# Patient Record
Sex: MALE | Race: WHITE | Employment: FULL TIME | URBAN - METROPOLITAN AREA
[De-identification: names, ages, dates, MRNs, and addresses within clinical notes are randomized per-mention and may not be internally consistent; named-entity substitution may affect disease eponyms.]

---

## 2021-04-03 ENCOUNTER — APPOINTMENT (OUTPATIENT)
Dept: GENERAL RADIOLOGY | Age: 71
End: 2021-04-03
Payer: COMMERCIAL

## 2021-04-03 ENCOUNTER — APPOINTMENT (OUTPATIENT)
Dept: CT IMAGING | Age: 71
End: 2021-04-03
Payer: COMMERCIAL

## 2021-04-03 ENCOUNTER — HOSPITAL ENCOUNTER (EMERGENCY)
Age: 71
Discharge: HOME | End: 2021-04-03
Attending: EMERGENCY MEDICINE
Payer: COMMERCIAL

## 2021-04-03 VITALS
WEIGHT: 215 LBS | DIASTOLIC BLOOD PRESSURE: 86 MMHG | HEIGHT: 69 IN | SYSTOLIC BLOOD PRESSURE: 176 MMHG | RESPIRATION RATE: 16 BRPM | HEART RATE: 78 BPM | TEMPERATURE: 97.4 F | BODY MASS INDEX: 31.84 KG/M2 | OXYGEN SATURATION: 97 %

## 2021-04-03 DIAGNOSIS — M25.511 ACUTE PAIN OF RIGHT SHOULDER: ICD-10-CM

## 2021-04-03 DIAGNOSIS — S09.90XA INJURY OF HEAD, INITIAL ENCOUNTER: ICD-10-CM
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
## 2021-04-03 LAB
ALBUMIN SERPL-MCNC: 4.1 G/DL (ref 3.5–5.2)
ALP BLD-CCNC: 92 U/L (ref 40–129)
ALT SERPL-CCNC: 22 U/L (ref 0–40)
ANION GAP SERPL CALCULATED.3IONS-SCNC: 8 MMOL/L (ref 7–16)
AST SERPL-CCNC: 26 U/L (ref 0–39)
BASOPHILS ABSOLUTE: 0.04 E9/L (ref 0–0.2)
BASOPHILS RELATIVE PERCENT: 0.5 % (ref 0–2)
BILIRUB SERPL-MCNC: 0.5 MG/DL (ref 0–1.2)
BUN BLDV-MCNC: 21 MG/DL (ref 8–23)
CALCIUM SERPL-MCNC: 9.4 MG/DL (ref 8.6–10.2)
CHLORIDE BLD-SCNC: 105 MMOL/L (ref 98–107)
CO2: 26 MMOL/L (ref 22–29)
CREAT SERPL-MCNC: 1.4 MG/DL (ref 0.7–1.2)
EOSINOPHILS ABSOLUTE: 0.31 E9/L (ref 0.05–0.5)
EOSINOPHILS RELATIVE PERCENT: 3.7 % (ref 0–6)
GFR AFRICAN AMERICAN: >60
GFR NON-AFRICAN AMERICAN: 50 ML/MIN/1.73
GLUCOSE BLD-MCNC: 97 MG/DL (ref 74–99)
HCT VFR BLD CALC: 46 % (ref 37–54)
HEMOGLOBIN: 15.5 G/DL (ref 12.5–16.5)
IMMATURE GRANULOCYTES #: 0.02 E9/L
IMMATURE GRANULOCYTES %: 0.2 % (ref 0–5)
INR BLD: 1.1
LIPASE: 41 U/L (ref 13–60)
LYMPHOCYTES ABSOLUTE: 2.8 E9/L (ref 1.5–4)
LYMPHOCYTES RELATIVE PERCENT: 33.5 % (ref 20–42)
MCH RBC QN AUTO: 33.8 PG (ref 26–35)
MCHC RBC AUTO-ENTMCNC: 33.7 % (ref 32–34.5)
MCV RBC AUTO: 100.4 FL (ref 80–99.9)
MONOCYTES ABSOLUTE: 1.06 E9/L (ref 0.1–0.95)
MONOCYTES RELATIVE PERCENT: 12.7 % (ref 2–12)
NEUTROPHILS ABSOLUTE: 4.13 E9/L (ref 1.8–7.3)
NEUTROPHILS RELATIVE PERCENT: 49.4 % (ref 43–80)
PDW BLD-RTO: 12.1 FL (ref 11.5–15)
PLATELET # BLD: 224 E9/L (ref 130–450)
PMV BLD AUTO: 10.3 FL (ref 7–12)
POTASSIUM REFLEX MAGNESIUM: 4.3 MMOL/L (ref 3.5–5)
PROTHROMBIN TIME: 11.3 SEC (ref 9.3–12.4)
RBC # BLD: 4.58 E12/L (ref 3.8–5.8)
SODIUM BLD-SCNC: 139 MMOL/L (ref 132–146)
TOTAL PROTEIN: 7.3 G/DL (ref 6.4–8.3)
TROPONIN: <0.01 NG/ML (ref 0–0.03)
WBC # BLD: 8.4 E9/L (ref 4.5–11.5)

## 2021-04-03 PROCEDURE — 6360000002 HC RX W HCPCS: Performed by: EMERGENCY MEDICINE

## 2021-04-03 PROCEDURE — 70450 CT HEAD/BRAIN W/O DYE: CPT

## 2021-04-03 PROCEDURE — 72125 CT NECK SPINE W/O DYE: CPT

## 2021-04-03 PROCEDURE — 84484 ASSAY OF TROPONIN QUANT: CPT

## 2021-04-03 PROCEDURE — 73130 X-RAY EXAM OF HAND: CPT

## 2021-04-03 PROCEDURE — 73060 X-RAY EXAM OF HUMERUS: CPT

## 2021-04-03 PROCEDURE — 80053 COMPREHEN METABOLIC PANEL: CPT

## 2021-04-03 PROCEDURE — 93005 ELECTROCARDIOGRAM TRACING: CPT | Performed by: EMERGENCY MEDICINE

## 2021-04-03 PROCEDURE — 74177 CT ABD & PELVIS W/CONTRAST: CPT

## 2021-04-03 PROCEDURE — 6360000004 HC RX CONTRAST MEDICATION: Performed by: RADIOLOGY

## 2021-04-03 PROCEDURE — 71260 CT THORAX DX C+: CPT

## 2021-04-03 PROCEDURE — 85025 COMPLETE CBC W/AUTO DIFF WBC: CPT

## 2021-04-03 PROCEDURE — 73552 X-RAY EXAM OF FEMUR 2/>: CPT

## 2021-04-03 PROCEDURE — 73502 X-RAY EXAM HIP UNI 2-3 VIEWS: CPT

## 2021-04-03 PROCEDURE — 83690 ASSAY OF LIPASE: CPT

## 2021-04-03 PROCEDURE — 99284 EMERGENCY DEPT VISIT MOD MDM: CPT

## 2021-04-03 PROCEDURE — 96374 THER/PROPH/DIAG INJ IV PUSH: CPT

## 2021-04-03 PROCEDURE — 85610 PROTHROMBIN TIME: CPT

## 2021-04-03 PROCEDURE — 73030 X-RAY EXAM OF SHOULDER: CPT

## 2021-04-03 RX ORDER — HYDRALAZINE HYDROCHLORIDE 20 MG/ML
10 INJECTION INTRAMUSCULAR; INTRAVENOUS ONCE
Status: COMPLETED | OUTPATIENT
Start: 2021-04-03 | End: 2021-04-03

## 2021-04-03 RX ORDER — PROMETHAZINE HYDROCHLORIDE 25 MG/ML
25 INJECTION, SOLUTION INTRAMUSCULAR; INTRAVENOUS ONCE
Status: DISCONTINUED | OUTPATIENT
Start: 2021-04-03 | End: 2021-04-03 | Stop reason: HOSPADM

## 2021-04-03 RX ORDER — FENTANYL CITRATE 50 UG/ML
50 INJECTION, SOLUTION INTRAMUSCULAR; INTRAVENOUS ONCE
Status: DISCONTINUED | OUTPATIENT
Start: 2021-04-03 | End: 2021-04-03

## 2021-04-03 RX ADMIN — HYDRALAZINE HYDROCHLORIDE 10 MG: 20 INJECTION INTRAMUSCULAR; INTRAVENOUS at 14:52

## 2021-04-03 RX ADMIN — IOPAMIDOL 75 ML: 755 INJECTION, SOLUTION INTRAVENOUS at 15:40

## 2021-04-03 SDOH — HEALTH STABILITY: MENTAL HEALTH: HOW OFTEN DO YOU HAVE A DRINK CONTAINING ALCOHOL?: NEVER

## 2021-04-03 ASSESSMENT — ENCOUNTER SYMPTOMS
COUGH: 0
SHORTNESS OF BREATH: 0
ABDOMINAL PAIN: 0
RHINORRHEA: 0
PHOTOPHOBIA: 0
NAUSEA: 0
BACK PAIN: 1
VOMITING: 0
COLOR CHANGE: 0

## 2021-04-03 ASSESSMENT — PAIN DESCRIPTION - ORIENTATION: ORIENTATION: RIGHT

## 2021-04-03 NOTE — ED NOTES
RN saw pt standing outside of room. RN asked pt what was wrong and pt stated he was feeling nauseated and not good. RN helped pt back to bed and told pt that RN was going to ask for medication at this time. RN came back to room with antiemetic and pt stated that he was feeling better and did not want the medication.  Pt stated he had to use the restroom at this time     Phillip Contreras RN  04/03/21 0571

## 2021-04-03 NOTE — ED PROVIDER NOTES
Suleman See is a 69 y.o. male presenting to the ED for falls, beginning yesterday ago. The complaint has been persistent, moderate in severity, and worsened by nothing. 68 yo m who notes fell yesterday at [facility] in [place] over a curb and fell onto right side. Pt denies loc. Pt notes pain is moderaqte and on all rigfht side. Pain is worse in right shoulder right lower pelvis and hip;. Pt also notes posterior rib pain blow scapula. [ pt took naproxen for pain at home. Pt denies any sob, cough or cold sxm abd ominal pain, chest pain, syncope. Pt drove himself in and ambulated back    Review of Systems:   Review of Systems   Constitutional: Negative for diaphoresis, fatigue and fever. HENT: Negative for congestion, postnasal drip and rhinorrhea. Eyes: Negative for photophobia and visual disturbance. Respiratory: Negative for cough and shortness of breath. Cardiovascular: Positive for chest pain. Negative for palpitations. Gastrointestinal: Negative for abdominal pain, nausea and vomiting. Endocrine: Negative for polyphagia and polyuria. Genitourinary: Negative for dysuria, flank pain and frequency. Musculoskeletal: Positive for back pain and neck pain. Skin: Negative for color change and wound. Allergic/Immunologic: Negative for food allergies and immunocompromised state. Neurological: Negative for seizures, syncope, numbness and headaches. Hematological: Does not bruise/bleed easily. Psychiatric/Behavioral: Negative for agitation. The patient is not nervous/anxious.                  --------------------------------------------- PAST HISTORY ---------------------------------------------  Past Medical History:  has no past medical history on file. Past Surgical History:  has a past surgical history that includes Tonsillectomy; Nose surgery; and Appendectomy. Social History:  reports that he has quit smoking.  He has never used smokeless tobacco. He reports that he does not drink alcohol or use drugs. Family History: family history is not on file. The patients home medications have been reviewed.     Allergies: Codeine    -------------------------------------------------- RESULTS -------------------------------------------------  All laboratory and radiology results have been personally reviewed by myself   LABS:  Results for orders placed or performed during the hospital encounter of 04/03/21   CBC Auto Differential   Result Value Ref Range    WBC 8.4 4.5 - 11.5 E9/L    RBC 4.58 3.80 - 5.80 E12/L    Hemoglobin 15.5 12.5 - 16.5 g/dL    Hematocrit 46.0 37.0 - 54.0 %    .4 (H) 80.0 - 99.9 fL    MCH 33.8 26.0 - 35.0 pg    MCHC 33.7 32.0 - 34.5 %    RDW 12.1 11.5 - 15.0 fL    Platelets 265 154 - 326 E9/L    MPV 10.3 7.0 - 12.0 fL    Neutrophils % 49.4 43.0 - 80.0 %    Immature Granulocytes % 0.2 0.0 - 5.0 %    Lymphocytes % 33.5 20.0 - 42.0 %    Monocytes % 12.7 (H) 2.0 - 12.0 %    Eosinophils % 3.7 0.0 - 6.0 %    Basophils % 0.5 0.0 - 2.0 %    Neutrophils Absolute 4.13 1.80 - 7.30 E9/L    Immature Granulocytes # 0.02 E9/L    Lymphocytes Absolute 2.80 1.50 - 4.00 E9/L    Monocytes Absolute 1.06 (H) 0.10 - 0.95 E9/L    Eosinophils Absolute 0.31 0.05 - 0.50 E9/L    Basophils Absolute 0.04 0.00 - 0.20 E9/L   Comprehensive Metabolic Panel w/ Reflex to MG   Result Value Ref Range    Sodium 139 132 - 146 mmol/L    Potassium reflex Magnesium 4.3 3.5 - 5.0 mmol/L    Chloride 105 98 - 107 mmol/L    CO2 26 22 - 29 mmol/L    Anion Gap 8 7 - 16 mmol/L    Glucose 97 74 - 99 mg/dL    BUN 21 8 - 23 mg/dL    CREATININE 1.4 (H) 0.7 - 1.2 mg/dL    GFR Non-African American 50 >=60 mL/min/1.73    GFR African American >60     Calcium 9.4 8.6 - 10.2 mg/dL    Total Protein 7.3 6.4 - 8.3 g/dL    Albumin 4.1 3.5 - 5.2 g/dL    Total Bilirubin 0.5 0.0 - 1.2 mg/dL    Alkaline Phosphatase 92 40 - 129 U/L    ALT 22 0 - 40 U/L    AST 26 0 - 39 U/L   Lipase   Result Value Ref Range    Lipase 41 13 - 60 U/L   Troponin   Result Value Ref Range    Troponin <0.01 0.00 - 0.03 ng/mL   Protime-INR   Result Value Ref Range    Protime 11.3 9.3 - 12.4 sec    INR 1.1    EKG 12 Lead   Result Value Ref Range    Ventricular Rate 70 BPM    Atrial Rate 70 BPM    P-R Interval 206 ms    QRS Duration 82 ms    Q-T Interval 408 ms    QTc Calculation (Bazett) 440 ms    P Axis 44 degrees    R Axis -14 degrees    T Axis -15 degrees       RADIOLOGY:  Interpreted by Radiologist.  XR HAND LEFT (MIN 3 VIEWS)   Final Result   No evidence of acute fracture or dislocation of the left hand. Osteoarthritic changes noted. XR SHOULDER RIGHT (MIN 2 VIEWS)   Final Result   Mild osteoarthritic changes along the glenohumeral joint and diffuse   osteopenia with no acute abnormality seen. Mild hypert[facility] changes of the [facility] joint. XR FEMUR RIGHT (MIN 2 VIEWS)   Final Result   1. No acute osseous findings in the pelvis, right hip, nor right femur on   these exams. 2.  Severe right and mild left hip osteoarthritis. Right greater than left   CAM type deformities of the femoral head/neck junctions. 3.  Mild right knee osteoarthritis. Mild degenerative changes of the lumbar   spine and SI joints partially imaged. RECOMMENDATION:   (Negative radiographs should not deter from obtaining focused cross-sectional   imaging if there is high concern for an acute osseous injury. )         XR HIP 2-3 VW W PELVIS RIGHT   Final Result   1. No acute osseous findings in the pelvis, right hip, nor right femur on   these exams. 2.  Severe right and mild left hip osteoarthritis. Right greater than left   CAM type deformities of the femoral head/neck junctions. 3.  Mild right knee osteoarthritis. Mild degenerative changes of the lumbar   spine and SI joints partially imaged.       RECOMMENDATION:   (Negative radiographs should not deter from obtaining focused cross-sectional   imaging if there is high concern for an None   Final Result   No acute traumatic injury in the chest,, no acute infiltrate or pneumothorax. CT ABDOMEN AND PELVIS. The liver, gallbladder, spleen, pancreas, the adrenals and the kidneys are   normal except for cystic lesions in the kidneys, largest 1 in the right   kidney measuring 3.2 cm. There is calcification of the aorta and   degenerative changes in the lumbar spine and right hip. Pelvis. The bladder is contracted with mild wall thickening. The prostate   gland is prominent. There is diverticulosis of colon with constipation. There is no diverticulitis. There is probable previous fracture deformity of   the right hip. Impression      No acute traumatic injury, solid organ injury or acute inflammation in the   abdomen pelvis. Diverticulosis of colon with constipation without diverticulitis.             ------------------------- NURSING NOTES AND VITALS REVIEWED ---------------------------   The nursing notes within the ED encounter and vital signs as below have been reviewed. BP (!) 176/86   Pulse 78   Temp 97.4 °F (36.3 °C) (Temporal)   Resp 16   Ht 5' 9\" (1.753 m)   Wt 215 lb (97.5 kg)   SpO2 97%   BMI 31.75 kg/m²   Oxygen Saturation Interpretation: Normal      ---------------------------------------------------PHYSICAL EXAM--------------------------------------  \  Physical Exam  Vitals signs reviewed. Constitutional:       General: He is not in acute distress. Appearance: Normal appearance. He is not toxic-appearing. HENT:      Head: Normocephalic and atraumatic. Right Ear: External ear normal.      Left Ear: External ear normal.      Nose: Nose normal. No congestion. Mouth/Throat:      Mouth: Mucous membranes are moist.      Pharynx: Oropharynx is clear. No posterior oropharyngeal erythema. Eyes:      Extraocular Movements: Extraocular movements intact. Pupils: Pupils are equal, round, and reactive to light.    Neck: Musculoskeletal: Normal range of motion and neck supple. No muscular tenderness. Cardiovascular:      Rate and Rhythm: Normal rate and regular rhythm. Pulses: Normal pulses. Heart sounds: No murmur. Pulmonary:      Effort: Pulmonary effort is normal.      Breath sounds: No wheezing or rhonchi. Chest:      Chest wall: No tenderness. Abdominal:      General: Bowel sounds are normal.      Tenderness: There is no abdominal tenderness. There is no right CVA tenderness, left CVA tenderness or guarding. Musculoskeletal:         General: No swelling or deformity. Right shoulder: He exhibits decreased range of motion, tenderness and swelling. Skin:     General: Skin is warm and dry. Capillary Refill: Capillary refill takes less than 2 seconds. Neurological:      General: No focal deficit present. Mental Status: He is alert and oriented to person, place, and time. Psychiatric:         Mood and Affect: Mood normal.      m          ------------------------------ ED COURSE/MEDICAL DECISION MAKING----------------------  Medications   hydrALAZINE (APRESOLINE) injection 10 mg (10 mg Intravenous Given 4/3/21 1452)   iopamidol (ISOVUE-370) 76 % injection 75 mL (75 mLs Intravenous Given 4/3/21 1540)         ED COURSE:  ED Course as of Apr 04 0632   Sat Apr 03, 2021   1741 Discussed results and plan thus far. Patient was has not seen his family doctor in some time but did have carotid ultrasound about 2 years ago this Was Telling Him about the CT Findings Showing Atherosclerosis of His Carotids. Of Note While Talking the Patient Notes Patient Has Significant Ecchymosis of the Left Thumb with minimal Snuffbox Tenderness Will Order X-Ray Hand to Evaluate for Possible Scaphoid/thumb Fracture. Patient does state he would like to be discharged as he has been walking around department without difficulty.   Did discuss the possible old pelvic fracture in the site where he is currently having some condition is good      NOTE: This report was transcribed using voice recognition software.  Every effort was made to ensure accuracy; however, inadvertent computerized transcription errors may be present       Vadim Daigle DO  04/04/21 8857

## 2021-04-04 LAB
EKG ATRIAL RATE: 70 BPM
EKG P AXIS: 44 DEGREES
EKG P-R INTERVAL: 206 MS
EKG Q-T INTERVAL: 408 MS
EKG QRS DURATION: 82 MS
EKG QTC CALCULATION (BAZETT): 440 MS
EKG R AXIS: -14 DEGREES
EKG T AXIS: -15 DEGREES
EKG VENTRICULAR RATE: 70 BPM

## 2021-04-04 PROCEDURE — 93010 ELECTROCARDIOGRAM REPORT: CPT | Performed by: INTERNAL MEDICINE

## 2024-12-05 ENCOUNTER — ANESTHESIA (OUTPATIENT)
Dept: INTERVENTIONAL RADIOLOGY/VASCULAR | Age: 74
End: 2024-12-05
Payer: MEDICARE

## 2024-12-05 ENCOUNTER — HOSPITAL ENCOUNTER (INPATIENT)
Age: 74
LOS: 8 days | Discharge: NURSING, REHAB OR LONG-TERM CARE | DRG: 023 | End: 2024-12-13
Attending: STUDENT IN AN ORGANIZED HEALTH CARE EDUCATION/TRAINING PROGRAM | Admitting: INTERNAL MEDICINE
Payer: MEDICARE

## 2024-12-05 ENCOUNTER — ANESTHESIA EVENT (OUTPATIENT)
Dept: INTERVENTIONAL RADIOLOGY/VASCULAR | Age: 74
End: 2024-12-05
Payer: MEDICARE

## 2024-12-05 ENCOUNTER — APPOINTMENT (OUTPATIENT)
Dept: INTERVENTIONAL RADIOLOGY/VASCULAR | Age: 74
DRG: 023 | End: 2024-12-05
Payer: MEDICARE

## 2024-12-05 DIAGNOSIS — I63.9 CEREBROVASCULAR ACCIDENT (CVA), UNSPECIFIED MECHANISM (HCC): Primary | ICD-10-CM

## 2024-12-05 LAB
ALBUMIN SERPL-MCNC: 3.5 G/DL (ref 3.5–5.2)
ALP SERPL-CCNC: 66 U/L (ref 40–129)
ALT SERPL-CCNC: 13 U/L (ref 0–40)
ANION GAP SERPL CALCULATED.3IONS-SCNC: 7 MMOL/L (ref 7–16)
AST SERPL-CCNC: 18 U/L (ref 0–39)
BASOPHILS # BLD: 0.01 K/UL (ref 0–0.2)
BASOPHILS NFR BLD: 0 % (ref 0–2)
BILIRUB SERPL-MCNC: 0.7 MG/DL (ref 0–1.2)
BUN SERPL-MCNC: 21 MG/DL (ref 6–23)
CALCIUM SERPL-MCNC: 7.8 MG/DL (ref 8.6–10.2)
CHLORIDE SERPL-SCNC: 106 MMOL/L (ref 98–107)
CO2 SERPL-SCNC: 22 MMOL/L (ref 22–29)
CREAT SERPL-MCNC: 1.1 MG/DL (ref 0.7–1.2)
EOSINOPHIL # BLD: 0.01 K/UL (ref 0.05–0.5)
EOSINOPHILS RELATIVE PERCENT: 0 % (ref 0–6)
ERYTHROCYTE [DISTWIDTH] IN BLOOD BY AUTOMATED COUNT: 12.3 % (ref 11.5–15)
GFR, ESTIMATED: 69 ML/MIN/1.73M2
GLUCOSE SERPL-MCNC: 156 MG/DL (ref 74–99)
HCT VFR BLD AUTO: 36.9 % (ref 37–54)
HGB BLD-MCNC: 12.3 G/DL (ref 12.5–16.5)
IMM GRANULOCYTES # BLD AUTO: 0.05 K/UL (ref 0–0.58)
IMM GRANULOCYTES NFR BLD: 0 % (ref 0–5)
INR PPP: 1.1
LYMPHOCYTES NFR BLD: 0.63 K/UL (ref 1.5–4)
LYMPHOCYTES RELATIVE PERCENT: 6 % (ref 20–42)
MCH RBC QN AUTO: 33.7 PG (ref 26–35)
MCHC RBC AUTO-ENTMCNC: 33.3 G/DL (ref 32–34.5)
MCV RBC AUTO: 101.1 FL (ref 80–99.9)
MONOCYTES NFR BLD: 0.1 K/UL (ref 0.1–0.95)
MONOCYTES NFR BLD: 1 % (ref 2–12)
NEUTROPHILS NFR BLD: 93 % (ref 43–80)
NEUTS SEG NFR BLD: 10.39 K/UL (ref 1.8–7.3)
PLATELET # BLD AUTO: 161 K/UL (ref 130–450)
PMV BLD AUTO: 10.8 FL (ref 7–12)
POTASSIUM SERPL-SCNC: 4.2 MMOL/L (ref 3.5–5)
PROT SERPL-MCNC: 5.8 G/DL (ref 6.4–8.3)
PROTHROMBIN TIME: 12.1 SEC (ref 9.3–12.4)
RBC # BLD AUTO: 3.65 M/UL (ref 3.8–5.8)
SODIUM SERPL-SCNC: 135 MMOL/L (ref 132–146)
TROPONIN I SERPL HS-MCNC: 24 NG/L (ref 0–11)
WBC OTHER # BLD: 11.2 K/UL (ref 4.5–11.5)

## 2024-12-05 PROCEDURE — 3700000001 HC ADD 15 MINUTES (ANESTHESIA): Performed by: PSYCHIATRY & NEUROLOGY

## 2024-12-05 PROCEDURE — C1874 STENT, COATED/COV W/DEL SYS: HCPCS | Performed by: PSYCHIATRY & NEUROLOGY

## 2024-12-05 PROCEDURE — 2500000003 HC RX 250 WO HCPCS: Performed by: PSYCHIATRY & NEUROLOGY

## 2024-12-05 PROCEDURE — 3700000000 HC ANESTHESIA ATTENDED CARE: Performed by: PSYCHIATRY & NEUROLOGY

## 2024-12-05 PROCEDURE — 84484 ASSAY OF TROPONIN QUANT: CPT

## 2024-12-05 PROCEDURE — 037K3DZ DILATION OF RIGHT INTERNAL CAROTID ARTERY WITH INTRALUMINAL DEVICE, PERCUTANEOUS APPROACH: ICD-10-PCS | Performed by: PSYCHIATRY & NEUROLOGY

## 2024-12-05 PROCEDURE — 36620 INSERTION CATHETER ARTERY: CPT | Performed by: ANESTHESIOLOGY

## 2024-12-05 PROCEDURE — 61645 PERQ ART M-THROMBECT &/NFS: CPT | Performed by: PSYCHIATRY & NEUROLOGY

## 2024-12-05 PROCEDURE — 37799 UNLISTED PX VASCULAR SURGERY: CPT

## 2024-12-05 PROCEDURE — 6360000002 HC RX W HCPCS: Performed by: NURSE ANESTHETIST, CERTIFIED REGISTERED

## 2024-12-05 PROCEDURE — C1769 GUIDE WIRE: HCPCS

## 2024-12-05 PROCEDURE — 2580000003 HC RX 258

## 2024-12-05 PROCEDURE — 80053 COMPREHEN METABOLIC PANEL: CPT

## 2024-12-05 PROCEDURE — 2580000003 HC RX 258: Performed by: PSYCHIATRY & NEUROLOGY

## 2024-12-05 PROCEDURE — 6360000002 HC RX W HCPCS: Performed by: PSYCHIATRY & NEUROLOGY

## 2024-12-05 PROCEDURE — 99291 CRITICAL CARE FIRST HOUR: CPT | Performed by: SURGERY

## 2024-12-05 PROCEDURE — 99223 1ST HOSP IP/OBS HIGH 75: CPT | Performed by: INTERNAL MEDICINE

## 2024-12-05 PROCEDURE — C1876 STENT, NON-COA/NON-COV W/DEL: HCPCS | Performed by: PSYCHIATRY & NEUROLOGY

## 2024-12-05 PROCEDURE — 2500000003 HC RX 250 WO HCPCS: Performed by: NURSE ANESTHETIST, CERTIFIED REGISTERED

## 2024-12-05 PROCEDURE — 6370000000 HC RX 637 (ALT 250 FOR IP): Performed by: PSYCHIATRY & NEUROLOGY

## 2024-12-05 PROCEDURE — 6360000004 HC RX CONTRAST MEDICATION: Performed by: PSYCHIATRY & NEUROLOGY

## 2024-12-05 PROCEDURE — 76377 3D RENDER W/INTRP POSTPROCES: CPT

## 2024-12-05 PROCEDURE — 99223 1ST HOSP IP/OBS HIGH 75: CPT | Performed by: PSYCHIATRY & NEUROLOGY

## 2024-12-05 PROCEDURE — 85610 PROTHROMBIN TIME: CPT

## 2024-12-05 PROCEDURE — 61645 PERQ ART M-THROMBECT &/NFS: CPT

## 2024-12-05 PROCEDURE — 85025 COMPLETE CBC W/AUTO DIFF WBC: CPT

## 2024-12-05 PROCEDURE — 03CG3Z7 EXTIRPATION OF MATTER FROM INTRACRANIAL ARTERY USING STENT RETRIEVER, PERCUTANEOUS APPROACH: ICD-10-PCS | Performed by: PSYCHIATRY & NEUROLOGY

## 2024-12-05 PROCEDURE — 2000000000 HC ICU R&B

## 2024-12-05 PROCEDURE — 2580000003 HC RX 258: Performed by: NURSE ANESTHETIST, CERTIFIED REGISTERED

## 2024-12-05 PROCEDURE — 99285 EMERGENCY DEPT VISIT HI MDM: CPT

## 2024-12-05 RX ORDER — ROCURONIUM BROMIDE 10 MG/ML
INJECTION, SOLUTION INTRAVENOUS
Status: DISCONTINUED | OUTPATIENT
Start: 2024-12-05 | End: 2024-12-05 | Stop reason: SDUPTHER

## 2024-12-05 RX ORDER — FENTANYL CITRATE 50 UG/ML
INJECTION, SOLUTION INTRAMUSCULAR; INTRAVENOUS
Status: DISCONTINUED | OUTPATIENT
Start: 2024-12-05 | End: 2024-12-05 | Stop reason: SDUPTHER

## 2024-12-05 RX ORDER — PROPOFOL 10 MG/ML
INJECTION, EMULSION INTRAVENOUS
Status: DISCONTINUED | OUTPATIENT
Start: 2024-12-05 | End: 2024-12-05 | Stop reason: SDUPTHER

## 2024-12-05 RX ORDER — ASPIRIN 300 MG/1
300 SUPPOSITORY RECTAL DAILY
Status: DISCONTINUED | OUTPATIENT
Start: 2024-12-05 | End: 2024-12-08

## 2024-12-05 RX ORDER — LIDOCAINE HYDROCHLORIDE 20 MG/ML
INJECTION, SOLUTION EPIDURAL; INFILTRATION; INTRACAUDAL; PERINEURAL
Status: DISCONTINUED | OUTPATIENT
Start: 2024-12-05 | End: 2024-12-05 | Stop reason: SDUPTHER

## 2024-12-05 RX ORDER — SODIUM CHLORIDE 9 MG/ML
INJECTION, SOLUTION INTRAVENOUS
Status: DISCONTINUED | OUTPATIENT
Start: 2024-12-05 | End: 2024-12-05 | Stop reason: SDUPTHER

## 2024-12-05 RX ORDER — DEXAMETHASONE SODIUM PHOSPHATE 10 MG/ML
INJECTION, SOLUTION INTRAMUSCULAR; INTRAVENOUS
Status: DISCONTINUED | OUTPATIENT
Start: 2024-12-05 | End: 2024-12-05 | Stop reason: SDUPTHER

## 2024-12-05 RX ORDER — EPTIFIBATIDE 0.75 MG/ML
0.5 INJECTION, SOLUTION INTRAVENOUS CONTINUOUS
Status: DISPENSED | OUTPATIENT
Start: 2024-12-05 | End: 2024-12-06

## 2024-12-05 RX ORDER — SODIUM CHLORIDE 0.9 % (FLUSH) 0.9 %
5-40 SYRINGE (ML) INJECTION PRN
Status: DISCONTINUED | OUTPATIENT
Start: 2024-12-05 | End: 2024-12-14 | Stop reason: HOSPADM

## 2024-12-05 RX ORDER — IOPAMIDOL 612 MG/ML
INJECTION, SOLUTION INTRAVASCULAR PRN
Status: COMPLETED | OUTPATIENT
Start: 2024-12-05 | End: 2024-12-05

## 2024-12-05 RX ORDER — ONDANSETRON 4 MG/1
4 TABLET, ORALLY DISINTEGRATING ORAL EVERY 8 HOURS PRN
Status: DISCONTINUED | OUTPATIENT
Start: 2024-12-05 | End: 2024-12-14 | Stop reason: HOSPADM

## 2024-12-05 RX ORDER — ONDANSETRON 2 MG/ML
4 INJECTION INTRAMUSCULAR; INTRAVENOUS EVERY 6 HOURS PRN
Status: DISCONTINUED | OUTPATIENT
Start: 2024-12-05 | End: 2024-12-14 | Stop reason: HOSPADM

## 2024-12-05 RX ORDER — HEPARIN SODIUM 10000 [USP'U]/ML
INJECTION, SOLUTION INTRAVENOUS; SUBCUTANEOUS PRN
Status: COMPLETED | OUTPATIENT
Start: 2024-12-05 | End: 2024-12-05

## 2024-12-05 RX ORDER — SODIUM CHLORIDE 0.9 % (FLUSH) 0.9 %
5-40 SYRINGE (ML) INJECTION EVERY 12 HOURS SCHEDULED
Status: DISCONTINUED | OUTPATIENT
Start: 2024-12-05 | End: 2024-12-14 | Stop reason: HOSPADM

## 2024-12-05 RX ORDER — SODIUM CHLORIDE 9 MG/ML
INJECTION, SOLUTION INTRAVENOUS PRN
Status: DISCONTINUED | OUTPATIENT
Start: 2024-12-05 | End: 2024-12-14 | Stop reason: HOSPADM

## 2024-12-05 RX ORDER — SODIUM CHLORIDE 9 MG/ML
INJECTION, SOLUTION INTRAVENOUS CONTINUOUS
Status: DISCONTINUED | OUTPATIENT
Start: 2024-12-05 | End: 2024-12-08

## 2024-12-05 RX ORDER — ATORVASTATIN CALCIUM 40 MG/1
40 TABLET, FILM COATED ORAL NIGHTLY
Status: DISCONTINUED | OUTPATIENT
Start: 2024-12-05 | End: 2024-12-08

## 2024-12-05 RX ORDER — ONDANSETRON 2 MG/ML
INJECTION INTRAMUSCULAR; INTRAVENOUS
Status: DISCONTINUED | OUTPATIENT
Start: 2024-12-05 | End: 2024-12-05 | Stop reason: SDUPTHER

## 2024-12-05 RX ORDER — PHENYLEPHRINE HYDROCHLORIDE 10 MG/ML
INJECTION INTRAVENOUS
Status: DISCONTINUED | OUTPATIENT
Start: 2024-12-05 | End: 2024-12-05 | Stop reason: SDUPTHER

## 2024-12-05 RX ORDER — LABETALOL HYDROCHLORIDE 5 MG/ML
5 INJECTION, SOLUTION INTRAVENOUS EVERY 10 MIN PRN
Status: DISCONTINUED | OUTPATIENT
Start: 2024-12-05 | End: 2024-12-12

## 2024-12-05 RX ORDER — EPHEDRINE SULFATE/0.9% NACL/PF 25 MG/5 ML
SYRINGE (ML) INTRAVENOUS
Status: DISCONTINUED | OUTPATIENT
Start: 2024-12-05 | End: 2024-12-05 | Stop reason: SDUPTHER

## 2024-12-05 RX ADMIN — SODIUM CHLORIDE: 9 INJECTION, SOLUTION INTRAVENOUS at 20:39

## 2024-12-05 RX ADMIN — PHENYLEPHRINE HYDROCHLORIDE 50 MCG: 10 INJECTION, SOLUTION INTRAVENOUS at 18:16

## 2024-12-05 RX ADMIN — DEXAMETHASONE SODIUM PHOSPHATE 10 MG: 10 INJECTION INTRAMUSCULAR; INTRAVENOUS at 17:14

## 2024-12-05 RX ADMIN — SODIUM CHLORIDE: 9 INJECTION, SOLUTION INTRAVENOUS at 17:40

## 2024-12-05 RX ADMIN — ASPIRIN 300 MG: 300 SUPPOSITORY RECTAL at 20:59

## 2024-12-05 RX ADMIN — PHENYLEPHRINE HYDROCHLORIDE 50 MCG: 10 INJECTION, SOLUTION INTRAVENOUS at 18:21

## 2024-12-05 RX ADMIN — PHENYLEPHRINE HYDROCHLORIDE 100 MCG: 10 INJECTION, SOLUTION INTRAVENOUS at 18:37

## 2024-12-05 RX ADMIN — ROCURONIUM BROMIDE 50 MG: 10 INJECTION, SOLUTION INTRAVENOUS at 17:14

## 2024-12-05 RX ADMIN — TICAGRELOR 90 MG: 90 TABLET ORAL at 20:59

## 2024-12-05 RX ADMIN — IOPAMIDOL 150 ML: 612 INJECTION, SOLUTION INTRAVENOUS at 19:09

## 2024-12-05 RX ADMIN — PHENYLEPHRINE HYDROCHLORIDE 100 MCG: 10 INJECTION, SOLUTION INTRAVENOUS at 18:30

## 2024-12-05 RX ADMIN — SODIUM CHLORIDE: 9 INJECTION, SOLUTION INTRAVENOUS at 19:00

## 2024-12-05 RX ADMIN — Medication 1000 ML: at 19:09

## 2024-12-05 RX ADMIN — Medication 5000 UNITS: at 19:09

## 2024-12-05 RX ADMIN — SODIUM CHLORIDE: 9 INJECTION, SOLUTION INTRAVENOUS at 17:11

## 2024-12-05 RX ADMIN — SODIUM CHLORIDE, PRESERVATIVE FREE 10 ML: 5 INJECTION INTRAVENOUS at 20:40

## 2024-12-05 RX ADMIN — PROPOFOL 50 MG: 10 INJECTION, EMULSION INTRAVENOUS at 18:29

## 2024-12-05 RX ADMIN — PHENYLEPHRINE HYDROCHLORIDE 50 MCG: 10 INJECTION, SOLUTION INTRAVENOUS at 18:47

## 2024-12-05 RX ADMIN — ONDANSETRON HYDROCHLORIDE 4 MG: 2 SOLUTION INTRAMUSCULAR; INTRAVENOUS at 17:14

## 2024-12-05 RX ADMIN — EPHEDRINE SULFATE 10 MG: 5 INJECTION INTRAVENOUS at 18:55

## 2024-12-05 RX ADMIN — PHENYLEPHRINE HYDROCHLORIDE 50 MCG: 10 INJECTION, SOLUTION INTRAVENOUS at 18:11

## 2024-12-05 RX ADMIN — PHENYLEPHRINE HYDROCHLORIDE 50 MCG: 10 INJECTION, SOLUTION INTRAVENOUS at 18:41

## 2024-12-05 RX ADMIN — PHENYLEPHRINE HYDROCHLORIDE 50 MCG: 10 INJECTION, SOLUTION INTRAVENOUS at 18:33

## 2024-12-05 RX ADMIN — EPTIFIBATIDE 0.5 MCG/KG/MIN: 0.75 INJECTION INTRAVENOUS at 19:30

## 2024-12-05 RX ADMIN — PROPOFOL 170 MG: 10 INJECTION, EMULSION INTRAVENOUS at 17:14

## 2024-12-05 RX ADMIN — ATORVASTATIN CALCIUM 40 MG: 40 TABLET, FILM COATED ORAL at 20:59

## 2024-12-05 RX ADMIN — EPHEDRINE SULFATE 25 MG: 5 INJECTION INTRAVENOUS at 17:33

## 2024-12-05 RX ADMIN — PHENYLEPHRINE HYDROCHLORIDE 100 MCG: 10 INJECTION, SOLUTION INTRAVENOUS at 18:51

## 2024-12-05 RX ADMIN — PROPOFOL 30 MG: 10 INJECTION, EMULSION INTRAVENOUS at 18:05

## 2024-12-05 RX ADMIN — PHENYLEPHRINE HYDROCHLORIDE 50 MCG: 10 INJECTION, SOLUTION INTRAVENOUS at 18:26

## 2024-12-05 RX ADMIN — FENTANYL CITRATE 100 MCG: 50 INJECTION, SOLUTION INTRAMUSCULAR; INTRAVENOUS at 17:14

## 2024-12-05 RX ADMIN — LIDOCAINE HYDROCHLORIDE 40 MG: 20 INJECTION, SOLUTION EPIDURAL; INFILTRATION; INTRACAUDAL; PERINEURAL at 17:14

## 2024-12-05 RX ADMIN — EPHEDRINE SULFATE 15 MG: 5 INJECTION INTRAVENOUS at 18:59

## 2024-12-05 ASSESSMENT — PAIN - FUNCTIONAL ASSESSMENT: PAIN_FUNCTIONAL_ASSESSMENT: NONE - DENIES PAIN

## 2024-12-05 NOTE — ED NOTES
Stroke/ Rach Alert Time:      Time Neurologist called: Braden 1650  :    Time CT Notified:  1650      BRAIN alert time: (If applicable) Brain 1650

## 2024-12-05 NOTE — ED PROVIDER NOTES
[facility] EMERGENCY DEPARTMENT  EMERGENCY DEPARTMENT ENCOUNTER    Pt Name: Pelon Jarvis  MRN: [id number]  Birthdate 1950  Date of evaluation: 12/5/2024  Provider: Vinayak Malave MD  PCP: No primary care provider on file.  Note Started: 4:53 PM EST 12/5/24    HPI     Patient is a 74 y.o. male presents with a chief complaint of   Chief Complaint   Patient presents with    Cerebrovascular Accident     Pt transfer from [facility] for thrombectomy .    .    Patient's last known well is 8:00P.M. yesterday.  Patient was noted to have a CVA and was sent over for thrombectomy.  Patient does have significant right-sided weakness.  Patient is denying any chest pain or shortness of breath.  Denies any fevers or chills.  Patient denies any changes in urinary or bowel habits    Nursing Notes were all reviewed and agreed with or any disagreements were addressed in the HPI.    History From:     Review of Systems   Pertinent positives and negatives as per HPI.     Physical Exam  Vitals and nursing note reviewed.   Constitutional:       Appearance: He is well-developed.   HENT:      Head: Normocephalic and atraumatic.   Eyes:      Conjunctiva/sclera: Conjunctivae normal.   Cardiovascular:      Rate and Rhythm: Normal rate and regular rhythm.      Heart sounds: Normal heart sounds. No murmur heard.  Pulmonary:      Effort: Pulmonary effort is normal. No respiratory distress.      Breath sounds: Normal breath sounds. No wheezing or rales.   Abdominal:      General: Bowel sounds are normal.      Palpations: Abdomen is soft.      Tenderness: There is no abdominal tenderness. There is no guarding or rebound.   Musculoskeletal:         General: No tenderness or deformity.      Cervical back: Normal range of motion and neck supple.   Skin:     General: Skin is warm and dry.   Neurological:      Mental Status: He is alert.      Cranial Nerves: Cranial nerve deficit present.      Comments:

## 2024-12-05 NOTE — ANESTHESIA PROCEDURE NOTES
Arterial Line:    An arterial line was placed using surface landmarks, in the procedure area for the following indication(s): continuous blood pressure monitoring and blood sampling needed.    A 20 gauge (size), 1 and 3/4 inch (length), Arrow (type) catheter was placed, Seldinger technique used, into the left radial artery, secured by tape and Tegaderm.  Anesthesia type: General    Events:  patient tolerated procedure well with no complications.  Resident/CRNA: Hector Bowen APRN - CRNA  Performed: Resident/CRNA   Preanesthetic Checklist  Completed: patient identified, IV checked, site marked, risks and benefits discussed, surgical/procedural consents, equipment checked, pre-op evaluation, timeout performed, anesthesia consent given, oxygen available, monitors applied/VS acknowledged, fire risk safety assessment completed and verbalized and blood product R/B/A discussed and consented

## 2024-12-05 NOTE — CONSULTS
Stroke Neurology Consult Note  12/5/2024 5:04 PM  Pt Name: Pelon Jarvis  MRN: [id number]  YOB: 1950  Date of evaluation: 12/5/2024  Primary Care Physician: No primary care provider on file.            Stroke referral received from Matilda Dixon DO based upon patient's presenting stroke like symptomology.     Pelon Jarvis is a 74 y.o. male who presents with Right MCA syndrome  Transfer from OSH all workup completed and reported candidate for EVT for LVO.          Prior Functional Status(Modified [place] Scale):  0=No symptoms at all    Allergies   Allergen Reactions    Codeine        Medications  Prior to Admission medications    Not on File         No past medical history on file.  Past Surgical History:   Procedure Laterality Date    APPENDECTOMY      NOSE SURGERY      TONSILLECTOMY       Social History     Socioeconomic History    Marital status: Single     Spouse name: Not on file    Number of children: Not on file    Years of education: Not on file    Highest education level: Not on file   Occupational History    Not on file   Tobacco Use    Smoking status: Former    Smokeless tobacco: Never   Substance and Sexual Activity    Alcohol use: Never    Drug use: Never    Sexual activity: Not on file   Other Topics Concern    Not on file   Social History Narrative    Not on file     Social Determinants of Health     Financial Resource Strain: Not on file   Food Insecurity: Not on file   Transportation Needs: Not on file   Physical Activity: Not on file   Stress: Not on file   Social Connections: Not on file   Intimate Partner Violence: Not on file   Housing Stability: Not on file       OBJECTIVE  Vitals:    12/05/24 1652   BP: (!) 155/70   Pulse: 72   Resp: 19   Temp: 97.6 °F (36.4 °C)   SpO2: 98%     NIH Stroke Scale at time of initial evaluation:        NIHSS of 22    Imaging:  Done at OSH    No disc sent      Labs:  Labs Reviewed   CBC WITH AUTO DIFFERENTIAL   COMPREHENSIVE METABOLIC PANEL   TROPONIN

## 2024-12-05 NOTE — PROCEDURES
NEUROINTERVENTION PROCEDURE NOTE    PATIENT NAME: Pelon Jarvis  MRN: [id number]  : 1950  DATE OF PROCEDURE: 24    Stroke Metrics  NIHSS prior to procedure: 20  IV TNK Administered: [] Yes  [x]  No  Consent obtained: [x] Yes  []  No  by Dr. Feliciano   Pedal Pulses checked: +1 bilaterally pre-procedure    Vitals completed per anesthesia    Neurointerventionalist: Tate Tran MD  1st assistant Dion Kiser      Time Event Device Notes   1715 Access site puncture   Location: right femoral       1725    ANGIE 1st pass suction TICI Reperfusion grade: NA    1744    RMCA 2nd pass stent retriever and suction TICI Reperfusion grade: 2B    1816 3rd pass stent and balloon angioplasty  15 for 10 seconds TICI Reperfusion grade: 2B   1823 4th pass balloon angioplasty  10 for 8 seconds TICI Reperfusion grade: 2B       balloon angioplasty  10 for 14 seconds TICI Reperfusion grade: 2B     balloon angioplasty  10 for 5 seconds TICI Reperfusion grade: 2B     balloon angioplasty  10 for 5 seconds TICI Reperfusion grade: 2B     balloon angioplasty  6 for 3 seconds TICI Reperfusion grade: 2B     balloon angioplasty  7 for 8 seconds TICI Reperfusion grade: 2B     balloon angioplasty  14 for 8 seconds TICI Reperfusion ndgndrndanddndend:nd nd2nd 1852 Access site closure  Sheath pulled and  Angioseal used to close arterial puncture. Puncture site cleansed and dry dressing applied. No bleeding, swelling or complications noted, no change in pulses.      IA tPA adminstered: [] Yes  [x]  No        Post-procedure NIHSS unable to assess due to anesthesia/sedation and initial site assessment: site WNL, no bleeding or hematoma noted, dressing dry and intact. +1 bilateral pedal pulses.     Post-procedure NIHSS unable to assess due to anesthesia/sedation and initial site assessment: site WNL, no bleeding or hematoma noted, dressing dry and intact. +1 bilateral pedal pulses.     CT head completed.     Patient transported to [facility]

## 2024-12-05 NOTE — ANESTHESIA PRE PROCEDURE
Department of Anesthesiology  Preprocedure Note       Name:  Pelon Jarvis   Age:  74 y.o.  :  1950                                          MRN:  [id number]         Date:  2024      Surgeon: Surgeon(s):  Tate Tran MD    Procedure: IR stroke    Medications prior to admission:   Prior to Admission medications    Not on File       Current medications:    No current facility-administered medications for this encounter.     No current outpatient medications on file.     Facility-Administered Medications Ordered in Other Encounters   Medication Dose Route Frequency Provider Last Rate Last Admin    dexAMETHasone (PF) (DECADRON) injection   IntraVENous Once PRN Hunkus, Hector T, APRN - CRNA   10 mg at 24 1714    ondansetron (ZOFRAN) injection   IntraVENous Once PRN Hunkus, Hector T, APRN - CRNA   4 mg at 24 1714    lidocaine PF 2 % injection   IntraVENous Once PRN Hunkus, Hector T, APRN - CRNA   40 mg at 24 1714    fentaNYL (SUBLIMAZE) injection   IntraVENous Once PRN Hunkus, Hector T, APRN - CRNA   100 mcg at 24 1714    rocuronium (ZEMURON) injection   IntraVENous Once PRN Hunkus, Hector T, APRN - CRNA   50 mg at 24 1714    propofol infusion   IntraVENous Once PRN Hunkus, Hector T, APRN - CRNA   170 mg at 24 1714    0.9 % sodium chloride infusion   IntraVENous Continuous PRN Hunkus, Hector T, APRN - CRNA   New Bag at 24 1711    ePHEDrine injection   IntraVENous Once PRN Hunkus, Hector T, APRN - CRNA   25 mg at 24 1733    0.9 % sodium chloride infusion   IntraVENous Continuous PRN Hunkus, Hector T, APRN - CRNA   New Bag at 24 1740       Allergies:    Allergies   Allergen Reactions    Codeine        Problem List:    Patient Active Problem List   Diagnosis Code    Acute CVA (cerebrovascular accident) (HCC) I63.9       Past Medical History:  No past medical history on file.    Past Surgical History:        Procedure Laterality Date    APPENDECTOMY

## 2024-12-06 ENCOUNTER — APPOINTMENT (OUTPATIENT)
Age: 74
DRG: 023 | End: 2024-12-06
Payer: MEDICARE

## 2024-12-06 ENCOUNTER — APPOINTMENT (OUTPATIENT)
Dept: MRI IMAGING | Age: 74
DRG: 023 | End: 2024-12-06
Payer: MEDICARE

## 2024-12-06 PROBLEM — I10 HYPERTENSION: Chronic | Status: ACTIVE | Noted: 2024-12-06

## 2024-12-06 PROBLEM — G62.9 PERIPHERAL NEUROPATHY: Chronic | Status: ACTIVE | Noted: 2024-12-06

## 2024-12-06 PROBLEM — I10 HYPERTENSION: Chronic | Status: RESOLVED | Noted: 2024-12-06 | Resolved: 2024-12-06

## 2024-12-06 PROBLEM — M19.90 ARTHRITIS: Chronic | Status: ACTIVE | Noted: 2024-12-06

## 2024-12-06 PROBLEM — I10 HYPERTENSION: Status: ACTIVE | Noted: 2024-12-06

## 2024-12-06 PROBLEM — K90.0 CELIAC DISEASE: Chronic | Status: ACTIVE | Noted: 2024-12-06

## 2024-12-06 LAB
ALBUMIN SERPL-MCNC: 3.3 G/DL (ref 3.5–5.2)
ALP SERPL-CCNC: 60 U/L (ref 40–129)
ALT SERPL-CCNC: 12 U/L (ref 0–40)
ANION GAP SERPL CALCULATED.3IONS-SCNC: 7 MMOL/L (ref 7–16)
AST SERPL-CCNC: 24 U/L (ref 0–39)
BILIRUB SERPL-MCNC: 0.8 MG/DL (ref 0–1.2)
BUN SERPL-MCNC: 20 MG/DL (ref 6–23)
CALCIUM SERPL-MCNC: 8.2 MG/DL (ref 8.6–10.2)
CHLORIDE SERPL-SCNC: 109 MMOL/L (ref 98–107)
CHOLEST SERPL-MCNC: 167 MG/DL
CO2 SERPL-SCNC: 21 MMOL/L (ref 22–29)
CORTIS SERPL-MCNC: 13.9 UG/DL (ref 2.7–18.4)
CORTISOL COLLECTION INFO: NORMAL
CREAT SERPL-MCNC: 1.3 MG/DL (ref 0.7–1.2)
ECHO AV AREA PEAK VELOCITY: 1.6 CM2
ECHO AV AREA VTI: 1.8 CM2
ECHO AV CUSP MM: 1.8 CM
ECHO AV MEAN GRADIENT: 5 MMHG
ECHO AV MEAN VELOCITY: 1.1 M/S
ECHO AV PEAK GRADIENT: 10 MMHG
ECHO AV PEAK VELOCITY: 1.6 M/S
ECHO AV VELOCITY RATIO: 0.5
ECHO AV VTI: 36 CM
ECHO EST RA PRESSURE: 3 MMHG
ECHO LA DIAMETER: 4.1 CM
ECHO LA VOL A-L A2C: 38 ML (ref 18–58)
ECHO LA VOL A-L A4C: 76 ML (ref 18–58)
ECHO LA VOL BP: 51 ML (ref 18–58)
ECHO LA VOL MOD A2C: 35 ML (ref 18–58)
ECHO LA VOL MOD A4C: 68 ML (ref 18–58)
ECHO LA VOLUME AREA LENGTH: 56 ML
ECHO LV EF PHYSICIAN: 65 %
ECHO LV FRACTIONAL SHORTENING: 37 % (ref 28–44)
ECHO LV INTERNAL DIMENSION DIASTOLIC: 4.1 CM (ref 4.2–5.9)
ECHO LV INTERNAL DIMENSION SYSTOLIC: 2.6 CM
ECHO LV IVSD: 1 CM (ref 0.6–1)
ECHO LV IVSS: 1.2 CM
ECHO LV MASS 2D: 132.1 G (ref 88–224)
ECHO LV POSTERIOR WALL DIASTOLIC: 1 CM (ref 0.6–1)
ECHO LV POSTERIOR WALL SYSTOLIC: 1.1 CM
ECHO LV RELATIVE WALL THICKNESS RATIO: 0.49
ECHO LVOT AREA: 3.1 CM2
ECHO LVOT AV VTI INDEX: 0.61
ECHO LVOT DIAM: 2 CM
ECHO LVOT MEAN GRADIENT: 2 MMHG
ECHO LVOT PEAK GRADIENT: 3 MMHG
ECHO LVOT PEAK VELOCITY: 0.8 M/S
ECHO LVOT SV: 68.5 ML
ECHO LVOT VTI: 21.8 CM
ECHO MV "A" WAVE DURATION: 131.3 MSEC
ECHO MV A VELOCITY: 1.1 M/S
ECHO MV AREA PHT: 3.6 CM2
ECHO MV AREA VTI: 2.4 CM2
ECHO MV E DECELERATION TIME (DT): 257 MS
ECHO MV E VELOCITY: 0.74 M/S
ECHO MV E/A RATIO: 0.67
ECHO MV LVOT VTI INDEX: 1.32
ECHO MV MAX VELOCITY: 1.4 M/S
ECHO MV MEAN GRADIENT: 3 MMHG
ECHO MV MEAN VELOCITY: 0.9 M/S
ECHO MV PEAK GRADIENT: 7 MMHG
ECHO MV PRESSURE HALF TIME (PHT): 61 MS
ECHO MV VTI: 28.8 CM
ECHO PV MAX VELOCITY: 1.2 M/S
ECHO PV MEAN GRADIENT: 3 MMHG
ECHO PV MEAN VELOCITY: 0.9 M/S
ECHO PV PEAK GRADIENT: 6 MMHG
ECHO PV VTI: 26.8 CM
ECHO PVEIN A DURATION: 125.6 MS
ECHO PVEIN A VELOCITY: 0.3 M/S
ECHO PVEIN PEAK D VELOCITY: 0.3 M/S
ECHO PVEIN PEAK S VELOCITY: 0.5 M/S
ECHO PVEIN S/D RATIO: 1.7
ECHO RIGHT VENTRICULAR SYSTOLIC PRESSURE (RVSP): 54 MMHG
ECHO RV INTERNAL DIMENSION: 2.8 CM
ECHO TV REGURGITANT MAX VELOCITY: 3.56 M/S
ECHO TV REGURGITANT PEAK GRADIENT: 51 MMHG
EKG ATRIAL RATE: 52 BPM
EKG P-R INTERVAL: 198 MS
EKG Q-T INTERVAL: 458 MS
EKG QRS DURATION: 74 MS
EKG QTC CALCULATION (BAZETT): 425 MS
EKG R AXIS: 179 DEGREES
EKG T AXIS: -144 DEGREES
EKG VENTRICULAR RATE: 52 BPM
ERYTHROCYTE [DISTWIDTH] IN BLOOD BY AUTOMATED COUNT: 12.3 % (ref 11.5–15)
GFR, ESTIMATED: 60 ML/MIN/1.73M2
GLUCOSE SERPL-MCNC: 145 MG/DL (ref 74–99)
HBA1C MFR BLD: 5.6 % (ref 4–5.6)
HCT VFR BLD AUTO: 35.1 % (ref 37–54)
HDLC SERPL-MCNC: 50 MG/DL
HGB BLD-MCNC: 11.6 G/DL (ref 12.5–16.5)
LDLC SERPL CALC-MCNC: 102 MG/DL
MAGNESIUM SERPL-MCNC: 2.1 MG/DL (ref 1.6–2.6)
MCH RBC QN AUTO: 33.3 PG (ref 26–35)
MCHC RBC AUTO-ENTMCNC: 33 G/DL (ref 32–34.5)
MCV RBC AUTO: 100.9 FL (ref 80–99.9)
PHOSPHATE SERPL-MCNC: 2.2 MG/DL (ref 2.5–4.5)
PLATELET # BLD AUTO: 166 K/UL (ref 130–450)
PMV BLD AUTO: 10.8 FL (ref 7–12)
POTASSIUM SERPL-SCNC: 4.6 MMOL/L (ref 3.5–5)
PROT SERPL-MCNC: 5.5 G/DL (ref 6.4–8.3)
RBC # BLD AUTO: 3.48 M/UL (ref 3.8–5.8)
SODIUM SERPL-SCNC: 137 MMOL/L (ref 132–146)
TRIGL SERPL-MCNC: 74 MG/DL
TSH SERPL DL<=0.05 MIU/L-ACNC: 0.86 UIU/ML (ref 0.27–4.2)
VLDLC SERPL CALC-MCNC: 15 MG/DL
WBC OTHER # BLD: 11 K/UL (ref 4.5–11.5)

## 2024-12-06 PROCEDURE — 93306 TTE W/DOPPLER COMPLETE: CPT | Performed by: INTERNAL MEDICINE

## 2024-12-06 PROCEDURE — 2580000003 HC RX 258: Performed by: PSYCHIATRY & NEUROLOGY

## 2024-12-06 PROCEDURE — 6370000000 HC RX 637 (ALT 250 FOR IP): Performed by: PSYCHIATRY & NEUROLOGY

## 2024-12-06 PROCEDURE — 84443 ASSAY THYROID STIM HORMONE: CPT

## 2024-12-06 PROCEDURE — 99232 SBSQ HOSP IP/OBS MODERATE 35: CPT | Performed by: STUDENT IN AN ORGANIZED HEALTH CARE EDUCATION/TRAINING PROGRAM

## 2024-12-06 PROCEDURE — 2580000003 HC RX 258: Performed by: CLINICAL NURSE SPECIALIST

## 2024-12-06 PROCEDURE — 83735 ASSAY OF MAGNESIUM: CPT

## 2024-12-06 PROCEDURE — 93005 ELECTROCARDIOGRAM TRACING: CPT

## 2024-12-06 PROCEDURE — 93306 TTE W/DOPPLER COMPLETE: CPT

## 2024-12-06 PROCEDURE — 70551 MRI BRAIN STEM W/O DYE: CPT

## 2024-12-06 PROCEDURE — 2000000000 HC ICU R&B

## 2024-12-06 PROCEDURE — 85027 COMPLETE CBC AUTOMATED: CPT

## 2024-12-06 PROCEDURE — 6370000000 HC RX 637 (ALT 250 FOR IP): Performed by: CLINICAL NURSE SPECIALIST

## 2024-12-06 PROCEDURE — 36415 COLL VENOUS BLD VENIPUNCTURE: CPT

## 2024-12-06 PROCEDURE — 6360000002 HC RX W HCPCS: Performed by: PSYCHIATRY & NEUROLOGY

## 2024-12-06 PROCEDURE — 92523 SPEECH SOUND LANG COMPREHEN: CPT | Performed by: SPEECH-LANGUAGE PATHOLOGIST

## 2024-12-06 PROCEDURE — 83036 HEMOGLOBIN GLYCOSYLATED A1C: CPT

## 2024-12-06 PROCEDURE — 80061 LIPID PANEL: CPT

## 2024-12-06 PROCEDURE — 2500000003 HC RX 250 WO HCPCS: Performed by: CLINICAL NURSE SPECIALIST

## 2024-12-06 PROCEDURE — 84100 ASSAY OF PHOSPHORUS: CPT

## 2024-12-06 PROCEDURE — 92610 EVALUATE SWALLOWING FUNCTION: CPT | Performed by: SPEECH-LANGUAGE PATHOLOGIST

## 2024-12-06 PROCEDURE — 80053 COMPREHEN METABOLIC PANEL: CPT

## 2024-12-06 PROCEDURE — 82533 TOTAL CORTISOL: CPT

## 2024-12-06 PROCEDURE — 37799 UNLISTED PX VASCULAR SURGERY: CPT

## 2024-12-06 RX ORDER — NOREPINEPHRINE BITARTRATE 0.06 MG/ML
1-30 INJECTION, SOLUTION INTRAVENOUS CONTINUOUS
Status: DISCONTINUED | OUTPATIENT
Start: 2024-12-06 | End: 2024-12-06

## 2024-12-06 RX ORDER — 0.9 % SODIUM CHLORIDE 0.9 %
500 INTRAVENOUS SOLUTION INTRAVENOUS ONCE
Status: COMPLETED | OUTPATIENT
Start: 2024-12-06 | End: 2024-12-06

## 2024-12-06 RX ORDER — ATROPINE SULFATE 0.1 MG/ML
0.25 INJECTION INTRAVENOUS ONCE
Status: COMPLETED | OUTPATIENT
Start: 2024-12-06 | End: 2024-12-06

## 2024-12-06 RX ORDER — POLYETHYLENE GLYCOL 3350 17 G/17G
17 POWDER, FOR SOLUTION ORAL DAILY PRN
Status: DISCONTINUED | OUTPATIENT
Start: 2024-12-06 | End: 2024-12-08

## 2024-12-06 RX ORDER — SENNOSIDES A AND B 8.6 MG/1
1 TABLET, FILM COATED ORAL NIGHTLY
Status: DISCONTINUED | OUTPATIENT
Start: 2024-12-06 | End: 2024-12-08

## 2024-12-06 RX ORDER — NOREPINEPHRINE BITARTRATE 0.06 MG/ML
1-30 INJECTION, SOLUTION INTRAVENOUS CONTINUOUS
Status: DISCONTINUED | OUTPATIENT
Start: 2024-12-06 | End: 2024-12-07

## 2024-12-06 RX ADMIN — SODIUM CHLORIDE, PRESERVATIVE FREE 10 ML: 5 INJECTION INTRAVENOUS at 21:00

## 2024-12-06 RX ADMIN — TICAGRELOR 90 MG: 90 TABLET ORAL at 21:04

## 2024-12-06 RX ADMIN — ATORVASTATIN CALCIUM 40 MG: 40 TABLET, FILM COATED ORAL at 21:04

## 2024-12-06 RX ADMIN — SODIUM CHLORIDE: 9 INJECTION, SOLUTION INTRAVENOUS at 18:59

## 2024-12-06 RX ADMIN — SENNOSIDES 8.6 MG: 8.6 TABLET, FILM COATED ORAL at 21:04

## 2024-12-06 RX ADMIN — EPTIFIBATIDE 0.5 MCG/KG/MIN: 0.75 INJECTION INTRAVENOUS at 16:19

## 2024-12-06 RX ADMIN — ASPIRIN 300 MG: 300 SUPPOSITORY RECTAL at 08:50

## 2024-12-06 RX ADMIN — SODIUM CHLORIDE: 9 INJECTION, SOLUTION INTRAVENOUS at 08:57

## 2024-12-06 RX ADMIN — SODIUM CHLORIDE 500 ML: 9 INJECTION, SOLUTION INTRAVENOUS at 05:32

## 2024-12-06 RX ADMIN — ATROPINE SULFATE INJECTION 0.25 MG: 0.1 INJECTION, SOLUTION INTRAVENOUS at 10:03

## 2024-12-06 RX ADMIN — Medication 250 MG: at 21:04

## 2024-12-06 RX ADMIN — SODIUM CHLORIDE, PRESERVATIVE FREE 10 ML: 5 INJECTION INTRAVENOUS at 08:50

## 2024-12-06 RX ADMIN — FAMOTIDINE 20 MG: 10 INJECTION, SOLUTION INTRAVENOUS at 16:15

## 2024-12-06 NOTE — PROGRESS NOTES
Per Dr. Feliciano, MAP goal is >50. Dr. Feliciano aware of patient's HR occasionally dropping to rate of 40s.

## 2024-12-06 NOTE — PLAN OF CARE
Problem: Chronic Conditions and Co-morbidities  Goal: Patient's chronic conditions and co-morbidity symptoms are monitored and maintained or improved  12/6/2024 1020 by Rita Riggins RN  Outcome: Progressing  Flowsheets (Taken 12/6/2024 0800)  Care Plan - Patient's Chronic Conditions and Co-Morbidity Symptoms are Monitored and Maintained or Improved:   Monitor and assess patient's chronic conditions and comorbid symptoms for stability, deterioration, or improvement   Collaborate with multidisciplinary team to address chronic and comorbid conditions and prevent exacerbation or deterioration     Problem: Discharge Planning  Goal: Discharge to home or other facility with appropriate resources  12/6/2024 1020 by Rita Riggins RN  Outcome: Progressing  Flowsheets (Taken 12/6/2024 0800)  Discharge to home or other facility with appropriate resources:   Identify barriers to discharge with patient and caregiver   Arrange for needed discharge resources and transportation as appropriate     Problem: Safety - Adult  Goal: Free from fall injury  12/6/2024 1020 by Rita Riggins RN  Outcome: Progressing     Problem: Neurosensory - Adult  Goal: Achieves stable or improved neurological status  12/6/2024 1020 by Rita Riggins RN  Outcome: Progressing  Flowsheets (Taken 12/6/2024 0800)  Achieves stable or improved neurological status:   Assess for and report changes in neurological status   Initiate measures to prevent increased intracranial pressure   Maintain blood pressure and fluid volume within ordered parameters to optimize cerebral perfusion and minimize risk of hemorrhage   Monitor temperature, glucose, and sodium. Initiate appropriate interventions as ordered     Problem: Neurosensory - Adult  Goal: Absence of seizures  12/6/2024 1020 by Rita Riggins RN  Outcome: Progressing  Flowsheets (Taken 12/6/2024 0800)  Absence of seizures:   Monitor for seizure activity.  If seizure occurs, document type and  at baseline  12/6/2024 1020 by Rita Riggins, RN  Outcome: Progressing  Flowsheets (Taken 12/6/2024 0800)  Absence of cardiac dysrhythmias or at baseline:   Monitor cardiac rate and rhythm   Assess for signs of decreased cardiac output   Administer antiarrhythmia medication and electrolyte replacement as ordered

## 2024-12-06 NOTE — PROGRESS NOTES
Spiritual Health History and Assessment/Progress Note  [facility]    (P) Spiritual/Emotional Needs,  ,  ,      Name: Pelon Jarvis MRN: [id number]    Age: 74 y.o.     Sex: male   Language: English   Confucianism: Gnosticism   Cerebrovascular accident (CVA) ([facility])     Date: 12/6/2024                           Spiritual Assessment began in [facility]        Referral/Consult From: (P) Rounding   Encounter Overview/Reason: (P) Spiritual/Emotional Needs  Service Provided For: (P) Patient and family together    Juana, Belief, Meaning:   Patient identifies as spiritual  Family/Friends identify as spiritual      Importance and Influence:  Patient has spiritual/personal beliefs that influence decisions regarding their health  Family/Friends have spiritual/personal beliefs that influence decisions regarding the patient's health    Community:  Patient is connected with a spiritual community  Family/Friends are connected with a spiritual community:    Assessment and Plan of Care:     Patient Interventions include: Facilitated expression of thoughts and feelings and Affirmed coping skills/support systems  Family/Friends Interventions include: Facilitated expression of thoughts and feelings and Affirmed coping skills/support systems    Patient Plan of Care: No spiritual needs identified for follow-up  Family/Friends Plan of Care: No spiritual needs identified for follow-up    Patient was resting, with limited speaking.  Both the patient and wife were in good spirit.    Interesting fact - they have just been  recently.    Electronically signed by Chaplain VARINDER on 12/6/2024 at 1:23 PM

## 2024-12-06 NOTE — PROGRESS NOTES
Patient admitted to CVICU for occluded internal carotid and R MCA stroke s/p thrombectomy with R ICA stent placement. Patient connected to monitor, has simple face mask 6L O2, A&Ox4, R groin site clean dry intact with mild hematoma, sandbag applied. NIH 17.

## 2024-12-06 NOTE — PROGRESS NOTES
Patient admitted to [facility] with the following belongings:  Jewelry, Watch, Pants, Shirt, Socks, and Hat. The following belongings admitted with the patient, None, were sent home with the patient's family.

## 2024-12-06 NOTE — CARE COORDINATION
Spouse at bedside. Spoke with her regarding patient. They do not have the same last name but she states they are legally . She states they were  a few months ago. PCP is Dr. Chan in [place]. Patient typically completely independent, he does not use assistive devices. No NADIA/ARU history, patient only ever did outpatient therapy after a fall at work. [facility] with spouse. Needs unclear at this point. BP too low today for therapy. 2 steps to enter home, bedroom on second floor, bathroom on first floor. Spouse was making arrangements to move patient to first floor prior to stroke. She plans to follow up with these activities. She is available to provide hands on care as needed once released from rehab.  Will follow up based on progress.     Case Management Assessment  Initial Evaluation    Date/Time of Evaluation: 12/6/2024 9:40 AM  Assessment Completed by: KASSIE Gross    If patient is discharged prior to next notation, then this note serves as note for discharge by case management.    Patient Name: Pelon Jarvis                   YOB: 1950  Diagnosis: Acute CVA (cerebrovascular accident) (HCC) [I63.9]                   Date / Time: 12/5/2024  4:51 PM    Patient Admission Status: Inpatient   Readmission Risk (Low < 19, Mod (19-27), High > 27): Readmission Risk Score: 14.6    Current PCP: No primary care provider on file.  PCP verified by ? Yes    Chart Reviewed: Yes      History Provided by: Spouse  Patient Orientation: Self    Patient Cognition: Severely Impaired    Hospitalization in the last 30 days (Readmission):  No    If yes, Readmission Assessment in  Navigator will be completed.    Advance Directives:      Code Status: Full Code   Patient's Primary Decision Maker is: Legal Next of Kin    Primary Decision Maker: Lias Flaherty - Spouse - [phone]    Discharge Planning:    Patient lives with: Spouse/Significant Other Type of Home: House  Primary Care

## 2024-12-06 NOTE — PROGRESS NOTES
Subjective:      Pelon Jarvis post op follow up     Patient is clinically stable NIHSS is 17 , decrease    Review of Systems  Review of systems not obtained due to patient factors.      Objective:        Vitals:    12/06/24 1255   BP: (!) 94/49   Pulse: 58   Resp: 18   Temp:    SpO2: 98%       NIHSS 17    Lab Review  Lab Results   Component Value Date/Time    CHOL 167 12/06/2024 03:52 AM    TRIG 74 12/06/2024 03:52 AM    HDL 50 12/06/2024 03:52 AM         Assessment:     Right MCA M1 thrombus s/p thrombectomy with TICI 3 recanalization     Right % occlusion , wife reports known from last year after the procedure    S/p Multiple balloon angioplasty of the right internal carotid and Triple stent assisted complete reconstruction of the right ICA  on 12/5/2024       Plan:       MAP > 50 given extreme carotid reconstruction of a chronically occluded carotid likely - mulitple ( >7)angioplasty and 3 x stent assisted complete reconstruction of the cervical ICA    This is likely causing baroreceptor hypersensitivity and will likely resolve in 72 hours    Small dose of atropine challenge to help with HR and BP  Continue IV fluids    Integrelin drip to stop after 24 hours    Rectal ASA  [place] if passes swallow, if not needs NG

## 2024-12-06 NOTE — PROGRESS NOTES
SPEECH/LANGUAGE PATHOLOGY  SPEECH/LANGUAGE/COGNITIVE EVALUATION   and PLAN OF CARE      PATIENT NAME:  Pelon Jarvis  (male)     MRN:  [id number]    :  1950  (74 y.o.)  STATUS:  Inpatient: Room 3820/3820-A    TODAY'S DATE:  2024  ORDER DATE, DESCRIPTION AND REFERRING PROVIDER : 24    Speech Language Pathology eval  Start:  24,   End:  24,   ONE TIME,   Standing Count:  1 Occurrences,   R       Tate Tran MD  REASON FOR REFERRAL:  stroke  EVALUATING THERAPIST: DEMETRA Garcia    ADMITTING DIAGNOSIS: Acute CVA (cerebrovascular accident) (HCC) [I63.9]    VISIT DIAGNOSIS:        SPEECH THERAPY  PLAN OF CARE   The speech therapy  POC is established based on physician order, speech pathology diagnosis and results of clinical assessment     SPEECH PATHOLOGY DIAGNOSIS:    Moderate Dysarthria, MildCognitive Linguistic Deficits     Speech Pathology intervention is recommended up to 6 times per week for LOS or when goals are met with emphasis on the following:      Conditions Requiring Skilled Therapeutic Intervention for speech, language and/or cognition    Dysarthria   Cognitive linguistic impairment    Specific Speech Therapy Interventions to Include:   Therapeutic tasks for Cognition  Therapeutic exercises for dysarthria    Specific instructions for next treatment:     To initiate POC    SHORT/LONG TERM GOALS  Pt will improve immediate, short term, recent memory during structured and unstructured tasks with 80% accuracy   Pt will improve problem solving/thought organization during structured and unstructured tasks with 80% accuracy   Pt will improve speech intelligibility and increased articulatory precision via compensatory strategies and oral motor exercises     Patient goals: Patient/family involved in developing goals and treatment plan:   Treatment goals discussed with Patient and Family    The Patient did not demonstrate complete understanding of the

## 2024-12-06 NOTE — PROGRESS NOTES
Spiritual Health History and Assessment/Progress Note  [facility]    Initial Encounter, Spiritual/Emotional Needs,  ,  ,      Name: Pelon Jarvis MRN: [id number]    Age: 74 y.o.     Sex: male   Language: English   Hindu: Anabaptist   Cerebrovascular accident (CVA) ([facility])     Date: 12/6/2024                           Spiritual Assessment began in [facility]        Referral/Consult From: Nurse, Other , Rounding   Encounter Overview/Reason: Initial Encounter, Spiritual/Emotional Needs  Service Provided For: Patient, Significant other    Juana, Belief, Meaning:   Patient identifies as spiritual, is connected with a juana tradition or spiritual practice, and has beliefs or practices that help with coping during difficult times  Family/Friends identify as spiritual, are connected with a juana tradition or spiritual practice, and have beliefs or practices that help with coping during difficult times      Importance and Influence:  Patient has spiritual/personal beliefs that influence decisions regarding their health  Family/Friends have spiritual/personal beliefs that influence decisions regarding the patient's health    Community:  Patient is connected with a spiritual community  Family/Friends are connected with a spiritual community:    Assessment and Plan of Care:     Patient Interventions include: Affirmed coping skills/support systems and Provided sacramental/Yarsanism ritual  Family/Friends Interventions include: Provided sacramental/Yarsanism ritual    Patient Plan of Care: Spiritual Care available upon further referral  Family/Friends Plan of Care: Spiritual Care available upon further referral    Electronically signed by Chaplain Emily on 12/6/2024 at 12:47 PM

## 2024-12-06 NOTE — PROGRESS NOTES
4 Eyes Skin Assessment     NAME:  Pelon Jarvis  YOB: 1950  MEDICAL RECORD NUMBER:  [id number]    The patient is being assessed for  Admission    I agree that at least one RN has performed a thorough Head to Toe Skin Assessment on the patient. ALL assessment sites listed below have been assessed.      Areas assessed by both nurses:    Head, Face, Ears, Shoulders, Back, Chest, Arms, Elbows, Hands, Sacrum. Buttock, Coccyx, Ischium, Legs. Feet and Heels, and Under Medical Devices         Does the Patient have a Wound? No noted wound(s)       Roney Prevention initiated by RN: Yes  Wound Care Orders initiated by RN: No    Pressure Injury (Stage 3,4, Unstageable, DTI, NWPT, and Complex wounds) if present, place Wound referral order by RN under : No    New Ostomies, if present place, Ostomy referral order under : No     Nurse 1 eSignature: Electronically signed by Marylu Art RN on 12/6/24 at 1:38 AM EST    **SHARE this note so that the co-signing nurse can place an eSignature**    Nurse 2 eSignature: {Esignature:[id number]}

## 2024-12-06 NOTE — CONSULTS
SURGICAL CRITICAL CARE  ICU CONSULT NOTE      Date of admission:  12/5/2024  Reason for ICU transfer: Occluded internal carotid and right MCA stroke s/p thrombectomy with right ICA stent placement 12/5    HPI   Patient is a 74-year-old male who presented as a transfer from an outside hospital secondary to altered mental status from baseline as well as left upper and lower extremity weakness/paralysis.  Patient's reported last known well was approximately 8 PM on 12/4 per chart review.  Initial workup at outside ED demonstrated evidence of bilateral carotid artery occlusion as well as a right MCA occlusion.  Patient was not a TNK candidate due to timing of presentation.  Patient was transferred to [facility] for evaluation by possible neurointerventional services.  Patient was taken emergently for angiography.  Patient underwent right ICA stent placement and right MCA thrombectomy on 12/5.  Patient was started on ASA/Brilinta postoperatively.  Patient was admitted to CVICU for further monitoring.  Critical care was consulted secondary to the above.    PHYSICAL EXAM  Vitals:    12/05/24 1926   BP: 129/77   Pulse: 80   Resp: 18   Temp:    SpO2: 100%       GENERAL: Awake, oriented to person, intermittently confused.  HEAD:  Normocephalic. Atraumatic.   EYES:   No scleral icterus. PERRL.  LUNGS:  No increased work of breathing.  CARDIOVASCULAR: RR  ABDOMEN:  Soft, non-distended, non-tender. No guarding, rigidity, rebound.  EXTREMITIES:   Right groin access site with dressing clean/dry/intact without palpable hematoma noted.  Palpable DP/PT pulses bilateral lower extremities.  SKIN:  Warm and dry  NEUROLOGIC: Left-sided facial droop noted.  Flaccid paralysis noted over left upper and lower extremity.  Follows commands with right upper and lower extremity.      ASSESSMENT/PLAN  Neuro:  Right MCA CVA s/p right ICA stent with MCA thrombectomy 12/5-neurointerventional following.  Plan for 24-hour

## 2024-12-06 NOTE — BRIEF OP NOTE
Brief Postoperative Note      Patient: Pelon Jarvis  YOB: 1950  MRN: [id number]    Date of Procedure: 12/5/2024        Procedure: Right MCA , Right ICA stenting  Physician: GALEN TELLEZ MD  Assistant: IR TECH  Access:Right Femoral   Vessels injected:   Hemostasis: 8F angioseal , 10 min pressure   Anesthesia: Please see flowchart  Specimens: None  Blood loss: 100 ml   Contrast Material:  please see dictation in PACS  Fluoro time: Please see dictation in PACS      Diagnosis/Findings:   1. 100% occlusion of the right ICA, collateral flow to Supraclinoid intracranial ICA from orbital ECA branches  2. Tandem thrombus in the right MCA M1 100% occlusion intracranial s/p thrombectomy with final Recanalization TICI 3    3. Right ICA triple stent assisted angioplasty and recanalization     Plan:  1. Q15 min neuro checks x2 hours, Q30 for 6 hours and q1h for 24 hours and then q4h/q6h till discharge   2. Maintain SBP <140 per SVIN Guidelines Class 2 Recommendation MAP always greater than 65   3. Neurovascular checks   4. 24 hour CT head or MRI, if MRI is feasible ( If 24 hour MRI is obtained do not need 24 hour CT)  5. STAT CT head for any neurological decline and contact me immediately  6. Antiplatelet Recs ASA + Birlinta   Integrelin drip for 24 hours      Specimens: - indexed below is the Right MCA thrombus      Implants:  Implant Name Type Inv. Item Serial No.  Lot No. LRB No. Used Action   STENT CORONARY NICOLE FRONTIER RX [id number] MM ZOTAROLIMUS ELUTE - [id number] Coronary stents STENT CORONARY NICOLE FRONTIER RX 5X30 MM ZOTAROLIMUS ELUTE  [employer] [id number]  1 Implanted             Electronically signed by Galen Tellez MD on 12/5/2024 at 7:31 PM

## 2024-12-06 NOTE — PROGRESS NOTES
Hospitalist Progress Note      Chief Complaint:  had concerns including Cerebrovascular Accident (Pt transfer from [facility] for thrombectomy . ).    Admission Date: 2024     SYNOPSIS:Pelon Jarvis has a past medical history that includes CKD, carotid artery stenosis presented to [facility] with stroke like symptoms.  He was having left-sided weakness and difficulty with speech.  He initially had NIH of 23. He was found to have R M1 occlusion.  He was transferred ER to ER due to the patient being a candidate for EVT for LVO.   He was found to have 100% occlusion of the right ICA.  Tandem thrombus in the right MCA M1 100% occlusion intracranial status post thrombectomy.  He had right ICA triple stent assisted angioplasty and recanalization.  He was admitted to ICU.      : Hypotensive, received IV fluids, To keep MAP >50     SUBJECTIVE:    Patient seen and examined at bedside, has weakness on the left side, has left Lower extremity jerks.   Records reviewed.   Stable overnight. No overnight issues reported.    Temp (24hrs), Av °F (36.7 °C), Min:97.5 °F (36.4 °C), Max:98.9 °F (37.2 °C)    DIET: ADULT DIET; Dysphagia - Soft and Bite Sized  CODE: Full Code    Intake/Output Summary (Last 24 hours) at 2024 1229  Last data filed at 2024 0300  Gross per 24 hour   Intake 1200 ml   Output 900 ml   Net 300 ml       OBJECTIVE:    /77   Pulse 55   Temp 98.9 °F (37.2 °C) (Axillary)   Resp 10   Ht 1.753 m (5' 9\")   SpO2 98%   BMI 31.75 kg/m²     General appearance: No apparent distress, appears stated age and cooperative.   HEENT:  Normocephalic. Conjunctivae/corneas clear. Moist mucosa   Neck: Supple. No jugular venous distention. Thyroid not visible, non tender   Respiratory: Normal respiratory effort. Clear to auscultation bilaterally. No stridor/wheezing/rhonchi/crackles   Cardiovascular: Regular heart beats, normal S1-S2. No M/G/R  Abdomen: Non distended, soft, non tender, no  visceromegaly, no mass, normal bowel sounds   Musculoskeletal: No clubbing, cyanosis, no bilateral lower extremity edema. Brisk capillary refill.   Skin:  No rashes  on visible skin  Neurologic: awake following commands, left sided weakness,     ASSESSMENT andPLAN:    Right MCA ischemic stroke: s/p right ICA stent with MCA thrombectomy, neuro IR following, continue aspirin, Brilinta,  PT, OT, speech evaluation, echo,      Oropharyngeal dysphagia: Bedside swallow assessment, soft and bite sized diet     Hypotension: SBP goal less than 140, however he has been hypotensive, maintain MAP above 50, Continue IV fluids    OTTONIEL: Likely 2/2 Hypotension and decreased perfusion,Monitor in am labs, monitor urine output     DVT prophylaxis: SCDs     DISPOSITION: Remains in MICU    Medications:  REVIEWED DAILY    Infusion Medications    sodium chloride      eptifibatide 0.5 mcg/kg/min (12/05/24 1930)    sodium chloride 100 mL/hr at 12/06/24 0857     Scheduled Medications    potassium & sodium phosphates  1 packet Oral 4x Daily    senna  1 tablet Oral Nightly    sodium chloride flush  5-40 mL IntraVENous 2 times per day    atorvastatin  40 mg Oral Nightly    ticagrelor  90 mg Oral BID    aspirin  300 mg Rectal Daily     PRN Meds: polyethylene glycol, sodium chloride flush, sodium chloride, ondansetron **OR** ondansetron, labetalol    Labs:     Recent Labs     12/05/24 2043 12/06/24  0352   WBC 11.2 11.0   HGB 12.3* 11.6*   HCT 36.9* 35.1*    166       Recent Labs     12/05/24 2043 12/06/24  0352    137   K 4.2 4.6    109*   CO2 22 21*   BUN 21 20   CREATININE 1.1 1.3*   CALCIUM 7.8* 8.2*   PHOS  --  2.2*       Recent Labs     12/05/24 2043 12/06/24  0352   ALKPHOS 66 60   ALT 13 12   AST 18 24   BILITOT 0.7 0.8       Recent Labs     12/05/24 2043   INR 1.1       No results for input(s): \"CKTOTAL\", \"TROPONINI\" in the last 72 hours.    Chronic labs:    Lab Results   Component Value Date    CHOL 167 12/06/2024

## 2024-12-06 NOTE — PROGRESS NOTES
Intensive Care Unit  Critical Care Daily Progress Note   12/6/2024  Date of Admission: 12/5  Date of ICU Admission:  12/5    CC: confusion, left side weakness    HPI:   Presented to outside hospital with confusion and left side weakness that began the night prior. He was found to have a right MCA CVA. He was transferred to [facility] for potential IR intervention. In IR he had thrombectomy of the MCA and triple stent placed in right ICA. He was started on an integrelin gtt and admitted to [facility].    12/6 no changes overnight. He remains on the Initegrelin. He developed hypotension and bradycardia; he received a dose of atropine for the bradycardia with goal MAP of greater than 50 mmHg per Dr. Feliciano .     Problem List:   Patient Active Problem List   Diagnosis    Cerebrovascular accident (CVA) (HCC)    Celiac disease    Hypertension    Peripheral neuropathy    Arthritis       Surgical/Interventional Procedures: thrombectomy and stent placement    Allergies:   Allergies   Allergen Reactions    Codeine     Contrast [Gadolinium Derivatives] Nausea And Vomiting and Dizziness or Vertigo     Patient states he became dizzy and almost passed out.             PHYSICAL EXAM:  Vitals: Blood pressure (!) 94/51, pulse 64, temperature 98.9 °F (37.2 °C), temperature source Axillary, resp. rate 20, height 1.753 m (5' 9\"), SpO2 98%.  Body mass index is 31.75 kg/m².     Intake/Output Summary (Last 24 hours) at 12/6/2024 1504  Last data filed at 12/6/2024 1400  Gross per 24 hour   Intake 1200 ml   Output 1300 ml   Net -100 ml       Pain Description: none    General appearance:  comfortable, in hospital bed  Neurologic:   PERRL, oriented x 3  GCS:    4 - Opens eyes on own   6 - Follows simple motor commands  5 - Alert and oriented    Pupil size: Left 2 mm   Right 2 mm  Pupil reaction: Yes  Wiggles fingers: Left No Right Yes  Hand grasp:   Left absent    Right normal  Wiggles toes: Left No    Right Yes  Plantar flexion:  Left absent   Right  normal  Cardiovascular: S1/S2, regular rate, regular rhythm, no murmur/gallop/rub.   Pulmonary: no rhonchi/rales/wheezes, no use of accessory muscles  Renal: clear yellow urine  Abdomen: soft, nontender, nondistended, nontympanic, no masses, no organomegaly, normal bowel sounds   Musculoskeletal: moves extremities purposefully  Skin: no rashes/ecchymosis, no edema/clubbing, warm/dry, good capillary refill     Labs:   -I personally reviewed the patient's Labs from 12/6/2024       Assessment & Plan:       Neuro:  R MCA CVA, s/p thrombectomy R MCA and triple stent R ICA  Monitor neuro status.   Neurology following.   MRI at 24 hours.   Stroke protocol and education.     CV:  hypotension  History of: HTN  SBP goal less than 140 mmHg. MAP greater than 50 mmHg  Cardiac monitor  Monitor hemodynamics.   EKG reviewed  PRN hydralazine  PRN labetolol  Aspirin  Brilinta  statin  Integrelin     Pulm:  No acute issues     Continuous pulse oximetry  Monitor for respiratory insufficiency  Encourage cough and deep breathing.   Supplemental oxygen PRN to keep SpO2 above 92%    GI/Hepatic:  dysphagia  Monitor bowel function  Swallow eval when able.  SLP for dysphagia, cognitive evaluation  Place NG if does not pass swallow    Renal/FEN:    No acute issues     History of CKD stage 3  Monitor BUN & Cr.   Monitor urine output.   Avoid nephrotoxic medications  IVF: NS  Monitor electrolytes & replace as needed.    NPO      ID:   No acute issues.     Endocrine:   Hyperglycemia.  Monitor BGL. Target blood glucose less than 180 in the ICU.    MSK:   Left side weakness  ROM.   Turn & reposition.   PT/OT when able    Heme:   No acute issues.   Monitor CBC.   Keep Hgb above 7, transfuse PRN       Vascular access: PIV  Bowel regimen: glycol.  Last BM: PTA  Pain control/Sedation: tylenol  ICU PROPHYLAXIS:   Stress ulcer: H2 blocker   VTE: DAPT    Ancillary consults: medicine, neurology  Family update: all questions answered  Code status:

## 2024-12-06 NOTE — PLAN OF CARE
Problem: Chronic Conditions and Co-morbidities  Goal: Patient's chronic conditions and co-morbidity symptoms are monitored and maintained or improved  Outcome: Progressing  Flowsheets (Taken 12/6/2024 0139)  Care Plan - Patient's Chronic Conditions and Co-Morbidity Symptoms are Monitored and Maintained or Improved: Monitor and assess patient's chronic conditions and comorbid symptoms for stability, deterioration, or improvement     Problem: Discharge Planning  Goal: Discharge to home or other facility with appropriate resources  Outcome: Progressing  Flowsheets (Taken 12/6/2024 0139)  Discharge to home or other facility with appropriate resources:   Identify barriers to discharge with patient and caregiver   Identify discharge learning needs (meds, wound care, etc)   Arrange for needed discharge resources and transportation as appropriate     Problem: Safety - Adult  Goal: Free from fall injury  Outcome: Progressing  Flowsheets (Taken 12/6/2024 0139)  Free From Fall Injury: Instruct family/caregiver on patient safety     Problem: Skin/Tissue Integrity  Goal: Absence of new skin breakdown  Description: 1.  Monitor for areas of redness and/or skin breakdown  2.  Assess vascular access sites hourly  3.  Every 4-6 hours minimum:  Change oxygen saturation probe site  4.  Every 4-6 hours:  If on nasal continuous positive airway pressure, respiratory therapy assess nares and determine need for appliance change or resting period.  Outcome: Progressing     Problem: ABCDS Injury Assessment  Goal: Absence of physical injury  Outcome: Progressing  Flowsheets (Taken 12/6/2024 0139)  Absence of Physical Injury: Implement safety measures based on patient assessment     Problem: Neurosensory - Adult  Goal: Achieves stable or improved neurological status  Outcome: Progressing  Flowsheets (Taken 12/6/2024 0139)  Achieves stable or improved neurological status:   Assess for and report changes in neurological status   Maintain blood  notify Licensed Independent Practitioner for values outside of normal range   Assess for signs of decreased cardiac output  Goal: Absence of cardiac dysrhythmias or at baseline  Outcome: Progressing  Flowsheets (Taken 12/6/2024 0139)  Absence of cardiac dysrhythmias or at baseline:   Monitor cardiac rate and rhythm   Assess for signs of decreased cardiac output

## 2024-12-06 NOTE — PROGRESS NOTES
SPEECH/LANGUAGE PATHOLOGY  CLINICAL ASSESSMENT OF SWALLOWING FUNCTION   and PLAN OF CARE      PATIENT NAME:  Pelon Jarvis  (male)     MRN:  [id number]    :  1950  (74 y.o.)  STATUS:  Inpatient: Room 3820/3820-A    TODAY'S DATE:  24    Speech Language Pathology eval  Start:  24,   End:  24,   ONE TIME,   Standing Count:  1 Occurrences,   R       Tate Tran MD  REASON FOR REFERRAL:     stroke      EVALUATING THERAPIST: DEMETRA Garcia                 RESULTS:    DYSPHAGIA DIAGNOSIS:   Clinical indicators of oropharyngeal phase dysphagia       DIET RECOMMENDATIONS:  NPO with ongoing PO analysis by SLP only to determine if PO diet can be initiated         FEEDING RECOMMENDATIONS:     Assistance level:  Not applicable      Compensatory strategies recommended: Thorough oral care to prevent colonization of oral bacteria       Discussed recommendations with:  patient nurse in person, Patient , and caregiver/family     SPEECH THERAPY  PLAN OF CARE   The dysphagia POC is established based on physician order, dysphagia diagnosis and results of clinical assessment     Skilled SLP intervention for dysphagia management on acute care up to 5 x per week until goals met, pt plateaus in function and/or discharged from hospital    Conditions Requiring Skilled Therapeutic Intervention for dysphagia:    Patient is performing below functional baseline d/t  current acute condition, respiratory compromise, multiple medications, and/or increased dependency upon caregivers.  Impaired initiation of the swallow needed verbal cues  Throat clearing during PO intake   Not able to evaluate of solids on eval   patient unable to self feed which increases risk of aspiration pneumonia (Yehuda et al.,1998.)    Specific dysphagia interventions to include:     ongoing evaluation of swallow function to determine when PO diet can be safely initiated    Specific instructions for next  treatment:  ongoing skilled PO analysis to determine if PO diet can be initiated   Patient Treatment Goals:    Short Term Goals:  Pt will participate in ongoing evaluation of swallow function to determine when PO diet can be safely initiated    Long Term Goals:   Pt will improve oropharyngeal swallow function to ensure airway protection during PO intake to maintain adequate nutrition/hydration and decrease signs/symptoms of aspiration to less than 1 x/day.      Patient/family Goal:    To be able to eat regular foods and drink regular liquids    Plan of care discussed with Patient and Family   The Patient and Family understand(s) the diagnosis, prognosis and plan of care     Rehabilitation Potential/Prognosis: fair                    ADMITTING DIAGNOSIS: Acute CVA (cerebrovascular accident) ([facility]) [I63.9]    VISIT DIAGNOSIS:      PATIENT REPORT/COMPLAINT: denies difficulty swallowing  RN cleared patient for participation in assessment     yes     PRIOR LEVEL OF SWALLOW FUNCTION:    PAST HISTORY OF OROPHARYNGEAL DYSPHAGIA?: none reported    Home diet: Regular consistency solids (IDDSI level 7) with  thin liquids (IDDSI level 0)  Current Diet Order:  ADULT DIET; Dysphagia - Soft and Bite Sized    PROCEDURE:  Consistencies Administered During the Evaluation   Liquids: thin liquid and ice chips   Solids:  Pureed       Method of Intake:   spoon  Fed by clinician      Position:   Lying in bed with head elevated below 75 degrees due to patient discomfort    CLINICAL ASSESSMENT:  Oral Stage:       Xerostomia  Reduced oral acceptance from spoon  Impaired oral initiation  Delayed A-P transit due to: decreased ability for initiation    and cognitive function   Decreased bolus formation resulting in suspected premature pharyngeal spillage      Pharyngeal Stage:    Wet/gurgly vocal quality was noted after presentation of pureed foods  Multiple swallows were noted after presentation of thin liquid  Delayed initiation of the

## 2024-12-06 NOTE — PROGRESS NOTES
0925: Patient's MAPs have been sustaining between 45-50. RN messaged neuro NP and Dr. Feliciano.     1000: Dr. Feliciano at bed side. See orders for atropine in MAR.

## 2024-12-06 NOTE — PROGRESS NOTES
Physical Therapy  Physical Therapy Attempt    Name: Pelon Jarvis  : 1950  MRN: [id number]      Date of Service: 2024  Chart reviewed.  Spoke with RN - will hold at this time due to low blood pressure (80s/40s and MAPs in the low 50s).  Will re-attempt as able.    Skinny Aparicio, PT, DPT  [id number]

## 2024-12-06 NOTE — PROGRESS NOTES
OCCUPATIONAL THERAPY    Date:2024  Patient Name: Pleon Jarvis  MRN: [id number]  : 1950  Room: [facility]-A              Chart reviewed. Pt on hold this am for low BP.  Will re-attempt at later time. Thank you for consult.    Ana Johnson, OTR/L [id number]

## 2024-12-06 NOTE — PROGRESS NOTES
Speech Language Pathology  NAME:  Pelon Jarvis  :  1950  DATE: 2024  ROOM:  3820/3820-A    Pt unavailable at 930 for Clinical Swallow Evaluation Discussed with patient nurse in person, Patient , and caregiver/family  and Cognitive Evaluation Discussed with patient nurse in person, Patient , and caregiver/family       REASON:  HOLD per RN, Pt awaiting assessment by NP and Dr Feliciano.     Will re-attempt as appropriate.       Thank You

## 2024-12-07 ENCOUNTER — APPOINTMENT (OUTPATIENT)
Dept: GENERAL RADIOLOGY | Age: 74
DRG: 023 | End: 2024-12-07
Payer: MEDICARE

## 2024-12-07 ENCOUNTER — APPOINTMENT (OUTPATIENT)
Dept: CT IMAGING | Age: 74
DRG: 023 | End: 2024-12-07
Payer: MEDICARE

## 2024-12-07 PROBLEM — E86.1 HYPOTENSION DUE TO HYPOVOLEMIA: Status: ACTIVE | Noted: 2024-12-07

## 2024-12-07 LAB
ALBUMIN SERPL-MCNC: 3.4 G/DL (ref 3.5–5.2)
ALP SERPL-CCNC: 56 U/L (ref 40–129)
ALT SERPL-CCNC: 15 U/L (ref 0–40)
ANION GAP SERPL CALCULATED.3IONS-SCNC: 8 MMOL/L (ref 7–16)
ANION GAP SERPL CALCULATED.3IONS-SCNC: 8 MMOL/L (ref 7–16)
AST SERPL-CCNC: 41 U/L (ref 0–39)
BILIRUB SERPL-MCNC: 1.2 MG/DL (ref 0–1.2)
BUN SERPL-MCNC: 20 MG/DL (ref 6–23)
BUN SERPL-MCNC: 23 MG/DL (ref 6–23)
CALCIUM SERPL-MCNC: 8.1 MG/DL (ref 8.6–10.2)
CALCIUM SERPL-MCNC: 8.7 MG/DL (ref 8.6–10.2)
CHLORIDE SERPL-SCNC: 109 MMOL/L (ref 98–107)
CHLORIDE SERPL-SCNC: 110 MMOL/L (ref 98–107)
CO2 SERPL-SCNC: 19 MMOL/L (ref 22–29)
CO2 SERPL-SCNC: 22 MMOL/L (ref 22–29)
CREAT SERPL-MCNC: 1.3 MG/DL (ref 0.7–1.2)
CREAT SERPL-MCNC: 1.6 MG/DL (ref 0.7–1.2)
CREAT UR-MCNC: 129.9 MG/DL (ref 40–278)
ERYTHROCYTE [DISTWIDTH] IN BLOOD BY AUTOMATED COUNT: 12.8 % (ref 11.5–15)
GFR, ESTIMATED: 44 ML/MIN/1.73M2
GFR, ESTIMATED: 58 ML/MIN/1.73M2
GLUCOSE SERPL-MCNC: 109 MG/DL (ref 74–99)
GLUCOSE SERPL-MCNC: 95 MG/DL (ref 74–99)
HCT VFR BLD AUTO: 33.5 % (ref 37–54)
HGB BLD-MCNC: 10.7 G/DL (ref 12.5–16.5)
MAGNESIUM SERPL-MCNC: 2.1 MG/DL (ref 1.6–2.6)
MCH RBC QN AUTO: 34 PG (ref 26–35)
MCHC RBC AUTO-ENTMCNC: 31.9 G/DL (ref 32–34.5)
MCV RBC AUTO: 106.3 FL (ref 80–99.9)
PHOSPHATE SERPL-MCNC: 2.5 MG/DL (ref 2.5–4.5)
PLATELET # BLD AUTO: 144 K/UL (ref 130–450)
PMV BLD AUTO: 11 FL (ref 7–12)
POTASSIUM SERPL-SCNC: 4.1 MMOL/L (ref 3.5–5)
POTASSIUM SERPL-SCNC: 4.3 MMOL/L (ref 3.5–5)
PROT SERPL-MCNC: 5.7 G/DL (ref 6.4–8.3)
RBC # BLD AUTO: 3.15 M/UL (ref 3.8–5.8)
SODIUM SERPL-SCNC: 137 MMOL/L (ref 132–146)
SODIUM SERPL-SCNC: 139 MMOL/L (ref 132–146)
SODIUM UR-SCNC: 142 MMOL/L
WBC OTHER # BLD: 11.9 K/UL (ref 4.5–11.5)

## 2024-12-07 PROCEDURE — 2000000000 HC ICU R&B

## 2024-12-07 PROCEDURE — 99232 SBSQ HOSP IP/OBS MODERATE 35: CPT | Performed by: STUDENT IN AN ORGANIZED HEALTH CARE EDUCATION/TRAINING PROGRAM

## 2024-12-07 PROCEDURE — 2580000003 HC RX 258: Performed by: PSYCHIATRY & NEUROLOGY

## 2024-12-07 PROCEDURE — 36415 COLL VENOUS BLD VENIPUNCTURE: CPT

## 2024-12-07 PROCEDURE — 74018 RADEX ABDOMEN 1 VIEW: CPT

## 2024-12-07 PROCEDURE — 70450 CT HEAD/BRAIN W/O DYE: CPT

## 2024-12-07 PROCEDURE — 83735 ASSAY OF MAGNESIUM: CPT

## 2024-12-07 PROCEDURE — 85027 COMPLETE CBC AUTOMATED: CPT

## 2024-12-07 PROCEDURE — 6370000000 HC RX 637 (ALT 250 FOR IP): Performed by: CLINICAL NURSE SPECIALIST

## 2024-12-07 PROCEDURE — 84100 ASSAY OF PHOSPHORUS: CPT

## 2024-12-07 PROCEDURE — 80048 BASIC METABOLIC PNL TOTAL CA: CPT

## 2024-12-07 PROCEDURE — 6370000000 HC RX 637 (ALT 250 FOR IP): Performed by: PSYCHIATRY & NEUROLOGY

## 2024-12-07 PROCEDURE — 80053 COMPREHEN METABOLIC PANEL: CPT

## 2024-12-07 PROCEDURE — 2709999900 HC NON-CHARGEABLE SUPPLY

## 2024-12-07 PROCEDURE — 2580000003 HC RX 258: Performed by: CLINICAL NURSE SPECIALIST

## 2024-12-07 PROCEDURE — 82570 ASSAY OF URINE CREATININE: CPT

## 2024-12-07 PROCEDURE — 92610 EVALUATE SWALLOWING FUNCTION: CPT

## 2024-12-07 PROCEDURE — 84300 ASSAY OF URINE SODIUM: CPT

## 2024-12-07 PROCEDURE — 2500000003 HC RX 250 WO HCPCS: Performed by: CLINICAL NURSE SPECIALIST

## 2024-12-07 RX ORDER — HYDRALAZINE HYDROCHLORIDE 20 MG/ML
10 INJECTION INTRAMUSCULAR; INTRAVENOUS EVERY 10 MIN PRN
Status: DISCONTINUED | OUTPATIENT
Start: 2024-12-07 | End: 2024-12-12

## 2024-12-07 RX ADMIN — Medication 250 MG: at 18:02

## 2024-12-07 RX ADMIN — ATORVASTATIN CALCIUM 40 MG: 40 TABLET, FILM COATED ORAL at 20:57

## 2024-12-07 RX ADMIN — SODIUM CHLORIDE: 9 INJECTION, SOLUTION INTRAVENOUS at 16:26

## 2024-12-07 RX ADMIN — SODIUM CHLORIDE: 9 INJECTION, SOLUTION INTRAVENOUS at 04:45

## 2024-12-07 RX ADMIN — Medication 250 MG: at 09:43

## 2024-12-07 RX ADMIN — FAMOTIDINE 20 MG: 10 INJECTION, SOLUTION INTRAVENOUS at 09:27

## 2024-12-07 RX ADMIN — SODIUM CHLORIDE, PRESERVATIVE FREE 10 ML: 5 INJECTION INTRAVENOUS at 09:45

## 2024-12-07 RX ADMIN — TICAGRELOR 90 MG: 90 TABLET ORAL at 20:58

## 2024-12-07 RX ADMIN — SENNOSIDES 8.6 MG: 8.6 TABLET, FILM COATED ORAL at 20:58

## 2024-12-07 RX ADMIN — SODIUM CHLORIDE, PRESERVATIVE FREE 10 ML: 5 INJECTION INTRAVENOUS at 20:58

## 2024-12-07 RX ADMIN — Medication 250 MG: at 20:57

## 2024-12-07 NOTE — PROGRESS NOTES
Hospitalist Progress Note      Chief Complaint:  had concerns including Cerebrovascular Accident (Pt transfer from [facility] for thrombectomy . ).    Admission Date: 2024     SYNOPSIS:Pelon Jarvis has a past medical history that includes CKD, carotid artery stenosis presented to [facility] with stroke like symptoms.  He was having left-sided weakness and difficulty with speech.  He initially had NIH of 23. He was found to have R M1 occlusion.  He was transferred ER to ER due to the patient being a candidate for EVT for LVO.   He was found to have 100% occlusion of the right ICA.  Tandem thrombus in the right MCA M1 100% occlusion intracranial status post thrombectomy.  He had right ICA triple stent assisted angioplasty and recanalization.  He was admitted to ICU.      : Hypotensive, received IV fluids, To keep MAP >50     : MRI showing small area of hemorrhage, Neurosurgery consulted ASAkatrin held until seen by neurosurgery, Repeat CT head   BP improved, Cr: 1.6     SUBJECTIVE:    Patient seen and examined at bedside, has weakness on the left side, Records reviewed.   Stable overnight. No overnight issues reported.    Temp (24hrs), Av.5 °F (36.9 °C), Min:98.5 °F (36.9 °C), Max:98.5 °F (36.9 °C)    DIET: Diet NPO  CODE: Full Code    Intake/Output Summary (Last 24 hours) at 2024 1210  Last data filed at 2024 0900  Gross per 24 hour   Intake 2950.39 ml   Output 1350 ml   Net 1600.39 ml       OBJECTIVE:    BP (!) 112/41   Pulse 51   Temp 98.5 °F (36.9 °C) (Temporal)   Resp 21   Ht 1.753 m (5' 9\")   SpO2 100%   BMI 31.75 kg/m²     General appearance: No apparent distress, appears stated age and cooperative.   HEENT:  Normocephalic. Conjunctivae/corneas clear. Moist mucosa   Neck: Supple. No jugular venous distention. Thyroid not visible, non tender   Respiratory: Normal respiratory effort. Clear to auscultation bilaterally. No stridor/wheezing/rhonchi/crackles    Cardiovascular: Regular heart beats, normal S1-S2. No M/G/R  Abdomen: Non distended, soft, non tender, no visceromegaly, no mass, normal bowel sounds   Musculoskeletal: No clubbing, cyanosis, no bilateral lower extremity edema. Brisk capillary refill.   Skin:  No rashes  on visible skin  Neurologic: awake following commands, left sided weakness,     ASSESSMENT andPLAN:    Right MCA ischemic stroke: s/p right ICA stent with MCA thrombectomy, neuro IR following,   MRI showing area of hemorrhagic conversion, ASA, brillinta held until seen by neurosurgery, CT head pending   PT, OT, speech evaluation, echo,      Oropharyngeal dysphagia: NPO, Speech consulted, Corpak placemen tfor feeding     Hypotension: Improved     OTTONIEL: Likely 2/2 Hypotension and decreased perfusion, creatinine increased to 1.6, urine Na and creatinine sent , has good urine output, monitor urine output if oliguria or worsening labs will need Nephrology evaluation,Monitor in am labs, continue IV fluids     DVT prophylaxis: SCDs     DISPOSITION: Remains in MICU    Medications:  REVIEWED DAILY    Infusion Medications    sodium chloride      sodium chloride 100 mL/hr at 12/07/24 0615     Scheduled Medications    potassium & sodium phosphates  1 packet Oral 4x Daily    senna  1 tablet Oral Nightly    famotidine (PEPCID) injection  20 mg IntraVENous Daily    sodium chloride flush  5-40 mL IntraVENous 2 times per day    atorvastatin  40 mg Oral Nightly    [Held by provider] ticagrelor  90 mg Oral BID    [Held by provider] aspirin  300 mg Rectal Daily     PRN Meds: polyethylene glycol, sodium chloride flush, sodium chloride, ondansetron **OR** ondansetron, labetalol    Labs:     Recent Labs     12/05/24 2043 12/06/24  0352 12/07/24  0341   WBC 11.2 11.0 11.9*   HGB 12.3* 11.6* 10.7*   HCT 36.9* 35.1* 33.5*    166 144       Recent Labs     12/05/24 2043 12/06/24  0352 12/07/24  0341    137 139   K 4.2 4.6 4.3    109* 109*   CO2 22 21* 22

## 2024-12-07 NOTE — PROGRESS NOTES
Subjective:      Pelon Jarvis post op follow up     Patient is clinically stable NIHSS is 12- decrease    Review of Systems  Review of systems not obtained due to patient factors.      Objective:        Vitals:    12/07/24 1200   BP: (!) 147/65   Pulse: 53   Resp: 15   Temp:    SpO2:        NIHSS 12    Lab Review  Lab Results   Component Value Date/Time    CHOL 167 12/06/2024 03:52 AM    TRIG 74 12/06/2024 03:52 AM    HDL 50 12/06/2024 03:52 AM         Assessment:     Right MCA M1 thrombus s/p thrombectomy with TICI 3 recanalization     Right % occlusion , wife reports known from last year after the procedure    S/p Multiple balloon angioplasty of the right internal carotid and Triple stent assisted complete reconstruction of the right ICA  on 12/5/2024       Plan:       MAP > 50 given extreme carotid reconstruction of a chronically occluded carotid likely - mulitple ( >7)angioplasty and 3 x stent assisted complete reconstruction of the cervical ICA    This is likely causing baroreceptor hypersensitivity and will likely resolve in 72 hours      MRI - shows likely microthrombi    CT head to differentiate microthombi vs bleed      If ct head negative then please give antiplt    Todays issues- speech failed his swallow seems more drowsy -might be the reason

## 2024-12-07 NOTE — PLAN OF CARE
activity  Recent Flowsheet Documentation  Taken 12/6/2024 2000 by Hoa Mcfarland RN  Remains free of injury related to seizure activity: Maintain airway, patient safety  and administer oxygen as ordered  12/6/2024 1020 by Rita Riggins RN  Outcome: Progressing  Flowsheets (Taken 12/6/2024 0800)  Remains free of injury related to seizure activity:   Maintain airway, patient safety  and administer oxygen as ordered   Monitor patient for seizure activity, document and report duration and description of seizure to Licensed Independent Practitioner   If seizure occurs, turn patient to side and suction secretions as needed   Reorient patient post seizure  Goal: Achieves maximal functionality and self care  12/6/2024 1020 by Rita Riggins RN  Outcome: Progressing  Flowsheets (Taken 12/6/2024 0800)  Achieves maximal functionality and self care: Monitor swallowing and airway patency with patient fatigue and changes in neurological status     Problem: Respiratory - Adult  Goal: Achieves optimal ventilation and oxygenation  12/6/2024 2252 by Hoa Mcfarland RN  Outcome: Progressing  Flowsheets (Taken 12/6/2024 2000)  Achieves optimal ventilation and oxygenation: Assess for changes in respiratory status  12/6/2024 1020 by Rita Riggins RN  Outcome: Progressing  Flowsheets (Taken 12/6/2024 0800)  Achieves optimal ventilation and oxygenation:   Assess for changes in respiratory status   Assess for changes in mentation and behavior   Position to facilitate oxygenation and minimize respiratory effort     Problem: Cardiovascular - Adult  Goal: Maintains optimal cardiac output and hemodynamic stability  12/6/2024 2252 by Hoa Mcfarland RN  Outcome: Progressing  Flowsheets (Taken 12/6/2024 2000)  Maintains optimal cardiac output and hemodynamic stability: Monitor blood pressure and heart rate  12/6/2024 1020 by Rita Riggins RN  Outcome: Progressing  Flowsheets (Taken 12/6/2024 0800)  Maintains optimal cardiac

## 2024-12-07 NOTE — PROGRESS NOTES
Small bowel feeding tube inserted in left nares to the 90cm caridad using the [employer] tracking system. Negative pressure obtained, bridled, chest xray ordered.

## 2024-12-07 NOTE — PROGRESS NOTES
Spiritual Health History and Assessment/Progress Note  [facility]    Spiritual/Emotional Needs, Follow-up,  ,  ,      Name: Pelon Jarvis MRN: [id number]    Age: 74 y.o.     Sex: male   Language: English   Religious: Amish   Cerebrovascular accident (CVA) ([facility])     Date: 12/7/2024                           Spiritual Assessment continued in [facility]        Referral/Consult From: Rounding   Encounter Overview/Reason: Spiritual/Emotional Needs, Follow-up  Service Provided For: Patient    Juana, Belief, Meaning:   Patient identifies as spiritual, is connected with a juana tradition or spiritual practice, and has beliefs or practices that help with coping during difficult times  Family/Friends identify as spiritual, are connected with a juana tradition or spiritual practice, have beliefs or practices that help with coping during difficult times, and No family/friends present      Importance and Influence:  Patient has spiritual/personal beliefs that influence decisions regarding their health  Family/Friends have spiritual/personal beliefs that influence decisions regarding the patient's health and No family/friends present    Community:  Patient is connected with a spiritual community  Family/Friends are connected with a spiritual community: and No family/friends present    Assessment and Plan of Care:     Patient Interventions include: Provided sacramental/Uatsdin ritual  Family/Friends Interventions include: Provided sacramental/Uatsdin ritual and No family/friends present    Patient Plan of Care: Spiritual Care available upon further referral  Family/Friends Plan of Care: Spiritual Care available upon further referral and No family/friends present    Electronically signed by Chaplain Emily on 12/7/2024 at 10:01 AM

## 2024-12-07 NOTE — PROGRESS NOTES
Patient and wife educated about corpak - Explained to them the benefits. Patient is NPO and unable to swallow without choking at this time. Patient currently considering the corpak.

## 2024-12-07 NOTE — PROGRESS NOTES
SPEECH/LANGUAGE PATHOLOGY  CLINICAL ASSESSMENT OF SWALLOWING FUNCTION   and PLAN OF CARE      PATIENT NAME:  Pelon Jarvis  (male)     MRN:  [id number]    :  1950  (74 y.o.)  STATUS:  Inpatient: Room 3820/3820-A    TODAY'S DATE:  2024  ORDER DATE, DESCRIPTION AND REFERRING PROVIDER: SLP swallowing-dysphagia (IP) (Order #[id number]) on 24   REASON FOR REFERRAL: CVA   EVALUATING THERAPIST: DEMETRA Waite                 RESULTS:    DYSPHAGIA DIAGNOSIS:   Clinical indicators of moderate-severe oropharyngeal phase dysphagia       DIET RECOMMENDATIONS:  NPO -- OK for ice chips (2-3 per hour) PRN s/p oral care to promote pharyngeal muscle use, decrease risk of further muscle disuse atrophy, and thin pharyngeal secretions. Monitor respiratory status and discontinue ice chips if concern arises.        FEEDING RECOMMENDATIONS:     Assistance level:  Not applicable      Compensatory strategies recommended: Thorough oral care to prevent colonization of oral bacteria       Discussed recommendations with:  patient nurse in person    SPEECH THERAPY  PLAN OF CARE   The dysphagia POC is established based on physician order, dysphagia diagnosis and results of clinical assessment     Skilled SLP intervention for dysphagia management on acute care up to 5 x per week until goals met, pt plateaus in function and/or discharged from hospital    Conditions Requiring Skilled Therapeutic Intervention for dysphagia:    Patient is performing below functional baseline d/t  current acute condition, respiratory compromise, multiple medications, and/or increased dependency upon caregivers.    Specific dysphagia interventions to include:     ongoing evaluation of swallow function to determine when PO diet can be safely initiated    Specific instructions for next treatment:  ongoing skilled PO analysis to determine if PO diet can be initiated   Patient Treatment Goals:    Short Term Goals:  Pt will participate in ongoing  evaluation of swallow function to determine when PO diet can be safely initiated    Long Term Goals:   Pt will improve oropharyngeal swallow function to ensure airway protection during PO intake to maintain adequate nutrition/hydration and decrease signs/symptoms of aspiration to less than 1 x/day.      Patient/family Goal:    To be able to eat/drink     Plan of care discussed with Patient   The Patient understand(s) the diagnosis, prognosis and plan of care     Rehabilitation Potential/Prognosis: good                    ADMITTING DIAGNOSIS: Acute CVA (cerebrovascular accident) ([facility]) [I63.9]    VISIT DIAGNOSIS:      PATIENT REPORT/COMPLAINT: patient currently NPO pending results of this evaluation  RN cleared patient for participation in assessment     yes     PRIOR LEVEL OF SWALLOW FUNCTION:    PAST HISTORY OF OROPHARYNGEAL DYSPHAGIA?: none reported    Home diet: Regular consistency solids (IDDSI level 7) with  thin liquids (IDDSI level 0)  Current Diet Order:  Diet NPO    PROCEDURE:  Consistencies Administered During the Evaluation   Liquids: thin liquid   Solids:  Pureed       Method of Intake:   cup, straw, spoon  Self fed, Fed by clinician      Position:   Sitting in bed with head elevated above 75 degrees    CLINICAL ASSESSMENT:  Oral Stage:       Inadequate labial seal resulting anterior labial spillage on the left side      Pharyngeal Stage:    Immediate wet cough was noted after presentation of thin liquid and pureed foods  Multiple swallows were noted after presentation of pureed foods    Cognition:   Confusion noted    Oral Peripheral Examination   Left labiobuccal weakness and Left lingual deviation     Current Respiratory Status    room air     Parameters of Speech Production  Respiration:  Adequate for speech production  Quality:   Strained  Intensity: Within functional limits    Volitional Swallow: Present     Volitional Cough:  Present     Pain: No pain reported.    EDUCATION:   The Speech Language

## 2024-12-07 NOTE — PROGRESS NOTES
Intensive Care Unit  Critical Care Daily Progress Note   12/7/2024  Date of Admission: 12/5  Date of ICU Admission:  12/5    CC: confusion, left side weakness    HPI:   Presented to outside hospital with confusion and left side weakness that began the night prior. He was found to have a right MCA CVA. He was transferred to [facility] for potential IR intervention. In IR he had thrombectomy of the MCA and triple stent placed in right ICA. He was started on an integrelin gtt and admitted to [facility].    12/6 no changes overnight. He remains on the Initegrelin. He developed hypotension and bradycardia; he received a dose of atropine for the bradycardia with goal MAP of greater than 50 mmHg per Dr. Feliciano .   12/7 MRI completed, small area of hemorrhagic conversion identified. Neurosurgery consulted. Hold ASA and Brilinta until seen by neurosurgery and neurology.   UPDATE: CT completed, no hemorrhage identified. Aspirin and Brilinta unheld.     Problem List:   Patient Active Problem List   Diagnosis    Cerebrovascular accident (CVA) (HCC)    Celiac disease    Hypertension    Peripheral neuropathy    Arthritis       Surgical/Interventional Procedures: thrombectomy and stent placement    Allergies:   Allergies   Allergen Reactions    Codeine     Contrast [Gadolinium Derivatives] Nausea And Vomiting and Dizziness or Vertigo     Patient states he became dizzy and almost passed out.             PHYSICAL EXAM:  Vitals: Blood pressure (!) 99/57, pulse (!) 48, temperature 98.5 °F (36.9 °C), temperature source Temporal, resp. rate 12, height 1.753 m (5' 9\"), SpO2 100%.  Body mass index is 31.75 kg/m².     Intake/Output Summary (Last 24 hours) at 12/7/2024 0856  Last data filed at 12/7/2024 0700  Gross per 24 hour   Intake 2950.39 ml   Output 1400 ml   Net 1550.39 ml       Pain Description: none    General appearance:  comfortable, in hospital bed  Neurologic:   PERRL, oriented x 3  GCS:    4 - Opens eyes on own   6 - Follows simple motor

## 2024-12-08 ENCOUNTER — APPOINTMENT (OUTPATIENT)
Dept: GENERAL RADIOLOGY | Age: 74
DRG: 023 | End: 2024-12-08
Payer: MEDICARE

## 2024-12-08 LAB
ALBUMIN SERPL-MCNC: 3.4 G/DL (ref 3.5–5.2)
ALP SERPL-CCNC: 65 U/L (ref 40–129)
ALT SERPL-CCNC: 22 U/L (ref 0–40)
ANION GAP SERPL CALCULATED.3IONS-SCNC: 12 MMOL/L (ref 7–16)
ANION GAP SERPL CALCULATED.3IONS-SCNC: 7 MMOL/L (ref 7–16)
ANION GAP SERPL CALCULATED.3IONS-SCNC: 7 MMOL/L (ref 7–16)
AST SERPL-CCNC: 54 U/L (ref 0–39)
BILIRUB SERPL-MCNC: 1.2 MG/DL (ref 0–1.2)
BUN SERPL-MCNC: 18 MG/DL (ref 6–23)
BUN SERPL-MCNC: 18 MG/DL (ref 6–23)
BUN SERPL-MCNC: 19 MG/DL (ref 6–23)
CALCIUM SERPL-MCNC: 7.7 MG/DL (ref 8.6–10.2)
CALCIUM SERPL-MCNC: 8.2 MG/DL (ref 8.6–10.2)
CALCIUM SERPL-MCNC: 8.3 MG/DL (ref 8.6–10.2)
CHLORIDE SERPL-SCNC: 107 MMOL/L (ref 98–107)
CHLORIDE SERPL-SCNC: 111 MMOL/L (ref 98–107)
CHLORIDE SERPL-SCNC: 113 MMOL/L (ref 98–107)
CO2 SERPL-SCNC: 20 MMOL/L (ref 22–29)
CO2 SERPL-SCNC: 21 MMOL/L (ref 22–29)
CO2 SERPL-SCNC: 22 MMOL/L (ref 22–29)
CREAT SERPL-MCNC: 1.3 MG/DL (ref 0.7–1.2)
CREAT SERPL-MCNC: 1.3 MG/DL (ref 0.7–1.2)
CREAT SERPL-MCNC: 1.4 MG/DL (ref 0.7–1.2)
ERYTHROCYTE [DISTWIDTH] IN BLOOD BY AUTOMATED COUNT: 12.5 % (ref 11.5–15)
GFR, ESTIMATED: 54 ML/MIN/1.73M2
GFR, ESTIMATED: 55 ML/MIN/1.73M2
GFR, ESTIMATED: 58 ML/MIN/1.73M2
GLUCOSE SERPL-MCNC: 107 MG/DL (ref 74–99)
GLUCOSE SERPL-MCNC: 113 MG/DL (ref 74–99)
GLUCOSE SERPL-MCNC: 117 MG/DL (ref 74–99)
HCT VFR BLD AUTO: 34.3 % (ref 37–54)
HGB BLD-MCNC: 11.5 G/DL (ref 12.5–16.5)
MAGNESIUM SERPL-MCNC: 1.9 MG/DL (ref 1.6–2.6)
MCH RBC QN AUTO: 34.8 PG (ref 26–35)
MCHC RBC AUTO-ENTMCNC: 33.5 G/DL (ref 32–34.5)
MCV RBC AUTO: 103.9 FL (ref 80–99.9)
PHOSPHATE SERPL-MCNC: 2.2 MG/DL (ref 2.5–4.5)
PLATELET # BLD AUTO: 164 K/UL (ref 130–450)
PMV BLD AUTO: 11.3 FL (ref 7–12)
POTASSIUM SERPL-SCNC: 3.6 MMOL/L (ref 3.5–5)
POTASSIUM SERPL-SCNC: 3.7 MMOL/L (ref 3.5–5)
POTASSIUM SERPL-SCNC: 3.9 MMOL/L (ref 3.5–5)
PROT SERPL-MCNC: 6 G/DL (ref 6.4–8.3)
RBC # BLD AUTO: 3.3 M/UL (ref 3.8–5.8)
SODIUM SERPL-SCNC: 139 MMOL/L (ref 132–146)
SODIUM SERPL-SCNC: 140 MMOL/L (ref 132–146)
SODIUM SERPL-SCNC: 141 MMOL/L (ref 132–146)
WBC OTHER # BLD: 13.9 K/UL (ref 4.5–11.5)

## 2024-12-08 PROCEDURE — 80053 COMPREHEN METABOLIC PANEL: CPT

## 2024-12-08 PROCEDURE — 6370000000 HC RX 637 (ALT 250 FOR IP): Performed by: CLINICAL NURSE SPECIALIST

## 2024-12-08 PROCEDURE — 2580000003 HC RX 258: Performed by: PSYCHIATRY & NEUROLOGY

## 2024-12-08 PROCEDURE — 36415 COLL VENOUS BLD VENIPUNCTURE: CPT

## 2024-12-08 PROCEDURE — 80048 BASIC METABOLIC PNL TOTAL CA: CPT

## 2024-12-08 PROCEDURE — 84100 ASSAY OF PHOSPHORUS: CPT

## 2024-12-08 PROCEDURE — 2500000003 HC RX 250 WO HCPCS: Performed by: CLINICAL NURSE SPECIALIST

## 2024-12-08 PROCEDURE — 6370000000 HC RX 637 (ALT 250 FOR IP): Performed by: PSYCHIATRY & NEUROLOGY

## 2024-12-08 PROCEDURE — 83735 ASSAY OF MAGNESIUM: CPT

## 2024-12-08 PROCEDURE — 6370000000 HC RX 637 (ALT 250 FOR IP): Performed by: SURGERY

## 2024-12-08 PROCEDURE — 6360000002 HC RX W HCPCS: Performed by: CLINICAL NURSE SPECIALIST

## 2024-12-08 PROCEDURE — 2000000000 HC ICU R&B

## 2024-12-08 PROCEDURE — 2580000003 HC RX 258: Performed by: SURGERY

## 2024-12-08 PROCEDURE — 71045 X-RAY EXAM CHEST 1 VIEW: CPT

## 2024-12-08 PROCEDURE — 2580000003 HC RX 258: Performed by: CLINICAL NURSE SPECIALIST

## 2024-12-08 PROCEDURE — 99232 SBSQ HOSP IP/OBS MODERATE 35: CPT | Performed by: STUDENT IN AN ORGANIZED HEALTH CARE EDUCATION/TRAINING PROGRAM

## 2024-12-08 PROCEDURE — 85027 COMPLETE CBC AUTOMATED: CPT

## 2024-12-08 PROCEDURE — 81001 URINALYSIS AUTO W/SCOPE: CPT

## 2024-12-08 RX ORDER — POLYETHYLENE GLYCOL 3350 17 G/17G
17 POWDER, FOR SOLUTION ORAL DAILY
Status: DISCONTINUED | OUTPATIENT
Start: 2024-12-08 | End: 2024-12-09

## 2024-12-08 RX ORDER — FAMOTIDINE 20 MG/1
20 TABLET, FILM COATED ORAL 2 TIMES DAILY
Status: DISCONTINUED | OUTPATIENT
Start: 2024-12-08 | End: 2024-12-09

## 2024-12-08 RX ORDER — ATORVASTATIN CALCIUM 40 MG/1
40 TABLET, FILM COATED ORAL NIGHTLY
Status: DISCONTINUED | OUTPATIENT
Start: 2024-12-08 | End: 2024-12-14 | Stop reason: HOSPADM

## 2024-12-08 RX ORDER — ASPIRIN 81 MG/1
81 TABLET, CHEWABLE ORAL DAILY
Status: DISCONTINUED | OUTPATIENT
Start: 2024-12-08 | End: 2024-12-14 | Stop reason: HOSPADM

## 2024-12-08 RX ORDER — SENNOSIDES A AND B 8.6 MG/1
1 TABLET, FILM COATED ORAL NIGHTLY
Status: DISCONTINUED | OUTPATIENT
Start: 2024-12-08 | End: 2024-12-08

## 2024-12-08 RX ORDER — POLYETHYLENE GLYCOL 3350 17 G/17G
17 POWDER, FOR SOLUTION ORAL DAILY PRN
Status: DISCONTINUED | OUTPATIENT
Start: 2024-12-08 | End: 2024-12-08

## 2024-12-08 RX ORDER — 0.9 % SODIUM CHLORIDE 0.9 %
250 INTRAVENOUS SOLUTION INTRAVENOUS ONCE
Status: COMPLETED | OUTPATIENT
Start: 2024-12-08 | End: 2024-12-08

## 2024-12-08 RX ORDER — MODAFINIL 100 MG/1
50 TABLET ORAL DAILY
Status: DISCONTINUED | OUTPATIENT
Start: 2024-12-08 | End: 2024-12-09

## 2024-12-08 RX ADMIN — POLYETHYLENE GLYCOL 3350 17 G: 17 POWDER, FOR SOLUTION ORAL at 10:31

## 2024-12-08 RX ADMIN — ASPIRIN 81 MG CHEWABLE TABLET 81 MG: 81 TABLET CHEWABLE at 08:37

## 2024-12-08 RX ADMIN — SENNOSIDES 5 ML: 8.8 LIQUID ORAL at 20:42

## 2024-12-08 RX ADMIN — MODAFINIL 50 MG: 100 TABLET ORAL at 13:52

## 2024-12-08 RX ADMIN — TICAGRELOR 90 MG: 90 TABLET ORAL at 08:14

## 2024-12-08 RX ADMIN — ATORVASTATIN CALCIUM 40 MG: 40 TABLET, FILM COATED ORAL at 20:42

## 2024-12-08 RX ADMIN — TICAGRELOR 90 MG: 90 TABLET ORAL at 20:43

## 2024-12-08 RX ADMIN — FAMOTIDINE 20 MG: 20 TABLET, FILM COATED ORAL at 20:42

## 2024-12-08 RX ADMIN — HYDRALAZINE HYDROCHLORIDE 10 MG: 20 INJECTION INTRAMUSCULAR; INTRAVENOUS at 00:25

## 2024-12-08 RX ADMIN — Medication 250 MG: at 17:36

## 2024-12-08 RX ADMIN — FAMOTIDINE 20 MG: 10 INJECTION, SOLUTION INTRAVENOUS at 08:15

## 2024-12-08 RX ADMIN — Medication 250 MG: at 08:15

## 2024-12-08 RX ADMIN — SODIUM CHLORIDE, PRESERVATIVE FREE 10 ML: 5 INJECTION INTRAVENOUS at 20:43

## 2024-12-08 RX ADMIN — Medication 250 MG: at 13:52

## 2024-12-08 RX ADMIN — SODIUM CHLORIDE, PRESERVATIVE FREE 10 ML: 5 INJECTION INTRAVENOUS at 08:15

## 2024-12-08 RX ADMIN — Medication 250 MG: at 20:42

## 2024-12-08 RX ADMIN — SODIUM CHLORIDE 250 ML: 9 INJECTION, SOLUTION INTRAVENOUS at 09:01

## 2024-12-08 ASSESSMENT — PAIN SCALES - GENERAL: PAINLEVEL_OUTOF10: 0

## 2024-12-08 NOTE — PLAN OF CARE
Problem: Chronic Conditions and Co-morbidities  Goal: Patient's chronic conditions and co-morbidity symptoms are monitored and maintained or improved  12/8/2024 1021 by Ken Whitlock RN  Outcome: Progressing     Problem: Discharge Planning  Goal: Discharge to home or other facility with appropriate resources  12/8/2024 1021 by Ken Whitlock RN  Outcome: Progressing     Problem: Safety - Adult  Goal: Free from fall injury  12/8/2024 1021 by Ken hWitlock RN  Outcome: Progressing     Problem: Skin/Tissue Integrity  Goal: Absence of new skin breakdown  Description: 1.  Monitor for areas of redness and/or skin breakdown  2.  Assess vascular access sites hourly  3.  Every 4-6 hours minimum:  Change oxygen saturation probe site  4.  Every 4-6 hours:  If on nasal continuous positive airway pressure, respiratory therapy assess nares and determine need for appliance change or resting period.  12/8/2024 1021 by Ken Whitlock RN  Outcome: Progressing     Problem: ABCDS Injury Assessment  Goal: Absence of physical injury  12/8/2024 1021 by Ken Whitlock RN  Outcome: Progressing     Problem: Neurosensory - Adult  Goal: Achieves stable or improved neurological status  12/8/2024 1021 by Ken Whitlock RN  Outcome: Progressing     Problem: Neurosensory - Adult  Goal: Absence of seizures  12/8/2024 1021 by Ken Whitlock RN  Outcome: Progressing     Problem: Cardiovascular - Adult  Goal: Maintains optimal cardiac output and hemodynamic stability  12/8/2024 1021 by Ken Whitlock RN  Outcome: Progressing     Problem: Nutrition Deficit:  Goal: Optimize nutritional status  Outcome: Progressing

## 2024-12-08 NOTE — PROGRESS NOTES
Hospitalist Progress Note      Chief Complaint:  had concerns including Cerebrovascular Accident (Pt transfer from [facility] for thrombectomy . ).    Admission Date: 2024     SYNOPSIS:Pelon Jarvis has a past medical history that includes CKD, carotid artery stenosis presented to [facility] with stroke like symptoms.  He was having left-sided weakness and difficulty with speech.  He initially had NIH of 23. He was found to have R M1 occlusion.  He was transferred ER to ER due to the patient being a candidate for EVT for LVO.   He was found to have 100% occlusion of the right ICA.  Tandem thrombus in the right MCA M1 100% occlusion intracranial status post thrombectomy.  He had right ICA triple stent assisted angioplasty and recanalization.  He was admitted to ICU.      : Hypotensive, received IV fluids, To keep MAP >50     : MRI showing small area of hemorrhage, Neurosurgery consulted ASA, katrin held until seen by neurosurgery, Repeat CT head   BP improved, Cr: 1.6     : Cr improved to 1.3, CT head negative for Hemorrhage, Continue DAPT    SUBJECTIVE:    Patient seen and examined at bedside, has weakness on the left side, Records reviewed.   Stable overnight. No overnight issues reported.    Temp (24hrs), Av.3 °F (36.8 °C), Min:98.1 °F (36.7 °C), Max:98.7 °F (37.1 °C)    DIET: Diet NPO  ADULT TUBE FEEDING; Nasoenteric; Standard with Fiber; Continuous; 10; Yes; 10; Q 8 hours; 45; 150; Q 4 hours; Protein; 1 Dose; Daily  CODE: Full Code    Intake/Output Summary (Last 24 hours) at 2024 1302  Last data filed at 2024 1200  Gross per 24 hour   Intake 2612.57 ml   Output 1860 ml   Net 752.57 ml       OBJECTIVE:    BP 96/74   Pulse (!) 45   Temp 98.2 °F (36.8 °C) (Temporal)   Resp 15   Ht 1.753 m (5' 9.02\")   Wt 78 kg (171 lb 15.3 oz)   SpO2 100%   BMI 25.38 kg/m²     General appearance: No apparent distress, appears stated age and cooperative.   HEENT:   Normocephalic. Conjunctivae/corneas clear. Moist mucosa   Neck: Supple. No jugular venous distention. Thyroid not visible, non tender   Respiratory: Normal respiratory effort. Clear to auscultation bilaterally. No stridor/wheezing/rhonchi/crackles   Cardiovascular: Regular heart beats, normal S1-S2. No M/G/R  Abdomen: Non distended, soft, non tender, no visceromegaly, no mass, normal bowel sounds   Musculoskeletal: No clubbing, cyanosis, no bilateral lower extremity edema. Brisk capillary refill.   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 daily     Oropharyngeal dysphagia: NPO, Speech consulted, Corpak placement for feeding, To evaluate swallow fxn when patient more awake     Hypotension: Improved     OTTONIEL: Likely 2/2 Hypotension and decreased perfusion, creatinine increased to 1.6, Improved to 1.3, has good urine output, monitor urine output if oliguria or worsening labs will need Nephrology evaluation,Monitor in am labs, continue IV fluids     DVT prophylaxis: SCDs     DISPOSITION: Remains in MICU    Medications:  REVIEWED DAILY    Infusion Medications    sodium chloride       Scheduled Medications    aspirin  81 mg Per NG tube Daily    atorvastatin  40 mg Per NG tube Nightly    ticagrelor  90 mg Per NG tube BID    famotidine  20 mg Oral BID    potassium & sodium phosphates  1 packet Per NG tube 4x Daily    sennosides  5 mL Oral Nightly    polyethylene glycol  17 g Oral Daily    modafinil  50 mg Oral Daily    sodium chloride flush  5-40 mL IntraVENous 2 times per day     PRN Meds: hydrALAZINE, sodium chloride flush, sodium chloride, ondansetron **OR**

## 2024-12-08 NOTE — PROGRESS NOTES
Subjective:      Pelon Jarvis post op follow up     Patient is clinically stable NIHSS is 12- decrease    Review of Systems  Review of systems not obtained due to patient factors.      Objective:        Vitals:    12/08/24 1200   BP: 96/74   Pulse: (!) 45   Resp: 15   Temp:    SpO2: 100%       NIHSS 12    Lab Review  Lab Results   Component Value Date/Time    CHOL 167 12/06/2024 03:52 AM    TRIG 74 12/06/2024 03:52 AM    HDL 50 12/06/2024 03:52 AM         Assessment:     Right MCA M1 thrombus s/p thrombectomy with TICI 3 recanalization     Right % occlusion , wife reports known from last year after the procedure    S/p Multiple balloon angioplasty of the right internal carotid and Triple stent assisted complete reconstruction of the right ICA  on 12/5/2024       Plan:       MAP > 50 given extreme carotid reconstruction of a chronically occluded carotid likely - mulitple ( >7)angioplasty and 3 x stent assisted complete reconstruction of the cervical ICA    This is likely causing baroreceptor hypersensitivity and will likely resolve in 72 hours      MRI - shows likely microthrombi    CT head- no hemorrhage- continue DAPT for triple stents      NG in placed      Sleep - wake disorder- Modafinial 50 mg today , hopefully tolerates and can continue daily , if improves may increase to 100 mg in the am , will sign out to

## 2024-12-08 NOTE — PLAN OF CARE
Problem: Chronic Conditions and Co-morbidities  Goal: Patient's chronic conditions and co-morbidity symptoms are monitored and maintained or improved  Outcome: Progressing     Problem: Discharge Planning  Goal: Discharge to home or other facility with appropriate resources  Outcome: Progressing     Problem: Safety - Adult  Goal: Free from fall injury  Outcome: Progressing     Problem: Skin/Tissue Integrity  Goal: Absence of new skin breakdown  Outcome: Progressing     Problem: ABCDS Injury Assessment  Goal: Absence of physical injury  Outcome: Progressing     Problem: Neurosensory - Adult  Goal: Achieves stable or improved neurological status  Outcome: Progressing  Goal: Absence of seizures  Outcome: Progressing  Goal: Remains free of injury related to seizures activity  Outcome: Progressing  Goal: Achieves maximal functionality and self care  Outcome: Progressing     Problem: Cardiovascular - Adult  Goal: Maintains optimal cardiac output and hemodynamic stability  Outcome: Progressing  Goal: Absence of cardiac dysrhythmias or at baseline  Outcome: Progressing     Problem: Respiratory - Adult  Goal: Achieves optimal ventilation and oxygenation  Outcome: Completed

## 2024-12-08 NOTE — CONSULTS
Comprehensive Nutrition Assessment    Type and Reason for Visit:  Initial, Consult, Nutrition support    Nutrition Recommendations/Plan:   Continue NPO  Modify TF; recommend,      Standard w/ Fiber (Jevity 1.5) @ 45ml/hr + 1 pro mod to provide 1080ml TV, 1620kcals, 69g pro (w/ 1 pro mod is 1724kcals, 95g pro), 821ml free water.    Flush per critical care.    Monitor MAP- note hx of hypotension this adm (pt currently not on pressors)- if pt becomes hypotensive, may need to modify TF to a formula w/ less fiber (such as Osmolite).     Monitor/replace Lytes PRN (note low phos today).    Will monitor.      Malnutrition Assessment:  Malnutrition Status:  Insufficient data (12/08/24 1000)    Context:  Acute Illness     Findings of the 6 clinical characteristics of malnutrition:  Energy Intake:  Mild decrease in energy intake  Weight Loss:  Unable to assess     Body Fat Loss:  Unable to assess     Muscle Mass Loss:  Unable to assess    Fluid Accumulation:  No fluid accumulation     Strength:  Not Performed    Nutrition Assessment:    pt adm d/t CVA s/p thrombectomy w/ stent placement 12/5; PMhx of Celiac dx, HTN; pt failed swallow eval, SLP rec NPO- s/p corpak placement 12/7; note hx of hypotension this adm- continue to monitor MAP, if MAP drops below 50 hold TF; TF rec provided; will monitor.    Nutrition Related Findings:    NE tube w/ TF; MAP WNL w/ intermittent hypotension (No pressors); hypophosphatemia; Na/K WNL; A&Ox4 (oriented/disoriented at times); hypoactive BS; no edema; +I/O Wound Type:  (puncture wound noted)       Current Nutrition Intake & Therapies:    Average Meal Intake: NPO  Average Supplements Intake: NPO  Diet NPO  ADULT TUBE FEEDING; Nasoenteric; Standard with Fiber; Continuous; 10; Yes; 10; Q 8 hours; 45; 150; Q 4 hours; Protein; 1 Dose; Daily  Current Tube Feeding (TF) Orders:  Feeding Route: Nasoenteric  Formula: Standard with Fiber  Schedule: Continuous  Feeding Regimen:

## 2024-12-08 NOTE — PROGRESS NOTES
Intensive Care Unit  Critical Care Daily Progress Note   12/8/2024  Date of Admission: 12/5  Date of ICU Admission:  12/5    CC: confusion, left side weakness    HPI:   Presented to outside hospital with confusion and left side weakness that began the night prior. He was found to have a right MCA CVA. He was transferred to [facility] for potential IR intervention. In IR he had thrombectomy of the MCA and triple stent placed in right ICA. He was started on an integrelin gtt and admitted to [facility].    12/6 no changes overnight. He remains on the Initegrelin. He developed hypotension and bradycardia; he received a dose of atropine for the bradycardia with goal MAP of greater than 50 mmHg per Dr. Feliciano .   12/7 MRI completed, small area of hemorrhagic conversion identified. Neurosurgery consulted. Hold ASA and Brilinta until seen by neurosurgery and neurology.   UPDATE: CT completed, no hemorrhage identified. Aspirin and Brilinta unheld.     12/8 no events overnight. NG placed late afternoon after initially refusing, now on tube feeds    Problem List:   Patient Active Problem List   Diagnosis    Cerebrovascular accident (CVA) (HCC)    Celiac disease    Hypertension    Peripheral neuropathy    Arthritis    Hypotension due to hypovolemia       Surgical/Interventional Procedures: thrombectomy and stent placement    Allergies:   Allergies   Allergen Reactions    Codeine     Contrast [Gadolinium Derivatives] Nausea And Vomiting and Dizziness or Vertigo     Patient states he became dizzy and almost passed out.             PHYSICAL EXAM:  Vitals: Blood pressure (!) 115/42, pulse 57, temperature 98.1 °F (36.7 °C), temperature source Temporal, resp. rate (!) 7, height 1.753 m (5' 9\"), SpO2 98%.  Body mass index is 31.75 kg/m².     Intake/Output Summary (Last 24 hours) at 12/8/2024 0833  Last data filed at 12/8/2024 0635  Gross per 24 hour   Intake 2612.57 ml   Output 1510 ml   Net 1102.57 ml       Pain Description: none    General  weakness  ROM.   Turn & reposition.   PT/OT when able    Heme:   No acute issues.   Monitor CBC.   Keep Hgb above 7, transfuse PRN       Vascular access: PIV  Bowel regimen: glycol.  Last BM: PTA  Pain control/Sedation: tylenol  ICU PROPHYLAXIS:    Stress ulcer: H2 blocker   VTE: DAPT    Ancillary consults: medicine, neurology  Family update: all questions answered  Code status:  full  DISPOSITION:   Transfer to telemetry       I provided care to a patient with R MCA CVA requiring intensive monitoring, data review including imaging and laboratory studies, adjusting the clinical plan, and coordination with multiple team members and specialists at least 35 minutes so far today, excluding procedures.      Please feel free to call with questions or concerns.   MINDY Monk NP    Electronically signed by Lizbet Bustamante CNP on 12/8/2024 at 8:33 AM        NOTE: This report was transcribed using voice recognition software. Every effort was made to ensure accuracy; however, inadvertent computerized transcription errors may be present.

## 2024-12-09 PROBLEM — Z51.5 PALLIATIVE CARE ENCOUNTER: Status: ACTIVE | Noted: 2024-12-09

## 2024-12-09 PROBLEM — Z71.89 GOALS OF CARE, COUNSELING/DISCUSSION: Status: ACTIVE | Noted: 2024-12-09

## 2024-12-09 LAB
ALBUMIN SERPL-MCNC: 2.9 G/DL (ref 3.5–5.2)
ALP SERPL-CCNC: 66 U/L (ref 40–129)
ALT SERPL-CCNC: 32 U/L (ref 0–40)
ANION GAP SERPL CALCULATED.3IONS-SCNC: 4 MMOL/L (ref 7–16)
AST SERPL-CCNC: 50 U/L (ref 0–39)
BILIRUB SERPL-MCNC: 0.8 MG/DL (ref 0–1.2)
BILIRUB UR QL STRIP: NEGATIVE
BUN SERPL-MCNC: 18 MG/DL (ref 6–23)
CALCIUM SERPL-MCNC: 8 MG/DL (ref 8.6–10.2)
CHLORIDE SERPL-SCNC: 110 MMOL/L (ref 98–107)
CLARITY UR: CLEAR
CO2 SERPL-SCNC: 26 MMOL/L (ref 22–29)
COLOR UR: YELLOW
CREAT SERPL-MCNC: 1.4 MG/DL (ref 0.7–1.2)
ERYTHROCYTE [DISTWIDTH] IN BLOOD BY AUTOMATED COUNT: 12.5 % (ref 11.5–15)
GFR, ESTIMATED: 52 ML/MIN/1.73M2
GLUCOSE SERPL-MCNC: 102 MG/DL (ref 74–99)
GLUCOSE UR STRIP-MCNC: NEGATIVE MG/DL
HCT VFR BLD AUTO: 31 % (ref 37–54)
HGB BLD-MCNC: 10.2 G/DL (ref 12.5–16.5)
HGB UR QL STRIP.AUTO: NEGATIVE
KETONES UR STRIP-MCNC: NEGATIVE MG/DL
LEUKOCYTE ESTERASE UR QL STRIP: NEGATIVE
MAGNESIUM SERPL-MCNC: 2.1 MG/DL (ref 1.6–2.6)
MCH RBC QN AUTO: 34.1 PG (ref 26–35)
MCHC RBC AUTO-ENTMCNC: 32.9 G/DL (ref 32–34.5)
MCV RBC AUTO: 103.7 FL (ref 80–99.9)
NITRITE UR QL STRIP: NEGATIVE
PH UR STRIP: 5.5 [PH] (ref 5–9)
PHOSPHATE SERPL-MCNC: 2.8 MG/DL (ref 2.5–4.5)
PLATELET, FLUORESCENCE: 140 K/UL (ref 130–450)
PMV BLD AUTO: 11.4 FL (ref 7–12)
POTASSIUM SERPL-SCNC: 3.8 MMOL/L (ref 3.5–5)
PROT SERPL-MCNC: 5.2 G/DL (ref 6.4–8.3)
PROT UR STRIP-MCNC: NEGATIVE MG/DL
RBC # BLD AUTO: 2.99 M/UL (ref 3.8–5.8)
RBC #/AREA URNS HPF: NORMAL /HPF
SODIUM SERPL-SCNC: 140 MMOL/L (ref 132–146)
SP GR UR STRIP: 1.02 (ref 1–1.03)
UROBILINOGEN UR STRIP-ACNC: 0.2 EU/DL (ref 0–1)
WBC #/AREA URNS HPF: NORMAL /HPF
WBC OTHER # BLD: 11.1 K/UL (ref 4.5–11.5)

## 2024-12-09 PROCEDURE — 6370000000 HC RX 637 (ALT 250 FOR IP)

## 2024-12-09 PROCEDURE — 85027 COMPLETE CBC AUTOMATED: CPT

## 2024-12-09 PROCEDURE — 2000000000 HC ICU R&B

## 2024-12-09 PROCEDURE — 97530 THERAPEUTIC ACTIVITIES: CPT

## 2024-12-09 PROCEDURE — 6370000000 HC RX 637 (ALT 250 FOR IP): Performed by: SURGERY

## 2024-12-09 PROCEDURE — 83735 ASSAY OF MAGNESIUM: CPT

## 2024-12-09 PROCEDURE — 97166 OT EVAL MOD COMPLEX 45 MIN: CPT

## 2024-12-09 PROCEDURE — 6370000000 HC RX 637 (ALT 250 FOR IP): Performed by: PSYCHIATRY & NEUROLOGY

## 2024-12-09 PROCEDURE — 99232 SBSQ HOSP IP/OBS MODERATE 35: CPT | Performed by: STUDENT IN AN ORGANIZED HEALTH CARE EDUCATION/TRAINING PROGRAM

## 2024-12-09 PROCEDURE — 97162 PT EVAL MOD COMPLEX 30 MIN: CPT

## 2024-12-09 PROCEDURE — 99221 1ST HOSP IP/OBS SF/LOW 40: CPT | Performed by: CLINICAL NURSE SPECIALIST

## 2024-12-09 PROCEDURE — 36415 COLL VENOUS BLD VENIPUNCTURE: CPT

## 2024-12-09 PROCEDURE — 6370000000 HC RX 637 (ALT 250 FOR IP): Performed by: STUDENT IN AN ORGANIZED HEALTH CARE EDUCATION/TRAINING PROGRAM

## 2024-12-09 PROCEDURE — 6370000000 HC RX 637 (ALT 250 FOR IP): Performed by: NURSE PRACTITIONER

## 2024-12-09 PROCEDURE — 84100 ASSAY OF PHOSPHORUS: CPT

## 2024-12-09 PROCEDURE — 99232 SBSQ HOSP IP/OBS MODERATE 35: CPT | Performed by: NURSE PRACTITIONER

## 2024-12-09 PROCEDURE — 80053 COMPREHEN METABOLIC PANEL: CPT

## 2024-12-09 PROCEDURE — 2580000003 HC RX 258: Performed by: PSYCHIATRY & NEUROLOGY

## 2024-12-09 PROCEDURE — 6370000000 HC RX 637 (ALT 250 FOR IP): Performed by: CLINICAL NURSE SPECIALIST

## 2024-12-09 RX ORDER — ENOXAPARIN SODIUM 100 MG/ML
40 INJECTION SUBCUTANEOUS DAILY
Status: DISCONTINUED | OUTPATIENT
Start: 2024-12-10 | End: 2024-12-14 | Stop reason: HOSPADM

## 2024-12-09 RX ORDER — POLYETHYLENE GLYCOL 3350 17 G/17G
17 POWDER, FOR SOLUTION ORAL DAILY
Status: DISCONTINUED | OUTPATIENT
Start: 2024-12-10 | End: 2024-12-14 | Stop reason: HOSPADM

## 2024-12-09 RX ORDER — FAMOTIDINE 20 MG/1
20 TABLET, FILM COATED ORAL 2 TIMES DAILY
Status: DISCONTINUED | OUTPATIENT
Start: 2024-12-09 | End: 2024-12-12

## 2024-12-09 RX ORDER — HYDROCODONE BITARTRATE AND ACETAMINOPHEN 10; 325 MG/1; MG/1
1 TABLET ORAL ONCE
Status: COMPLETED | OUTPATIENT
Start: 2024-12-09 | End: 2024-12-09

## 2024-12-09 RX ORDER — ACETAMINOPHEN 325 MG/1
650 TABLET ORAL EVERY 4 HOURS PRN
Status: DISCONTINUED | OUTPATIENT
Start: 2024-12-09 | End: 2024-12-12

## 2024-12-09 RX ORDER — MODAFINIL 100 MG/1
50 TABLET ORAL DAILY
Status: DISCONTINUED | OUTPATIENT
Start: 2024-12-10 | End: 2024-12-14 | Stop reason: HOSPADM

## 2024-12-09 RX ADMIN — TICAGRELOR 90 MG: 90 TABLET ORAL at 19:48

## 2024-12-09 RX ADMIN — Medication 250 MG: at 17:28

## 2024-12-09 RX ADMIN — MODAFINIL 50 MG: 100 TABLET ORAL at 08:03

## 2024-12-09 RX ADMIN — TICAGRELOR 90 MG: 90 TABLET ORAL at 08:03

## 2024-12-09 RX ADMIN — Medication 1 APPLICATION: at 19:50

## 2024-12-09 RX ADMIN — SODIUM CHLORIDE, PRESERVATIVE FREE 10 ML: 5 INJECTION INTRAVENOUS at 08:03

## 2024-12-09 RX ADMIN — ATORVASTATIN CALCIUM 40 MG: 40 TABLET, FILM COATED ORAL at 19:48

## 2024-12-09 RX ADMIN — HYDROCODONE BITARTRATE AND ACETAMINOPHEN 1 TABLET: 10; 325 TABLET ORAL at 21:31

## 2024-12-09 RX ADMIN — FAMOTIDINE 20 MG: 20 TABLET, FILM COATED ORAL at 08:03

## 2024-12-09 RX ADMIN — FAMOTIDINE 20 MG: 20 TABLET, FILM COATED ORAL at 19:47

## 2024-12-09 RX ADMIN — Medication 250 MG: at 12:33

## 2024-12-09 RX ADMIN — POLYETHYLENE GLYCOL 3350 17 G: 17 POWDER, FOR SOLUTION ORAL at 08:03

## 2024-12-09 RX ADMIN — PETROLATUM: 420 OINTMENT TOPICAL at 19:50

## 2024-12-09 RX ADMIN — Medication 250 MG: at 19:47

## 2024-12-09 RX ADMIN — Medication 250 MG: at 08:03

## 2024-12-09 RX ADMIN — SODIUM CHLORIDE, PRESERVATIVE FREE 10 ML: 5 INJECTION INTRAVENOUS at 19:48

## 2024-12-09 RX ADMIN — ASPIRIN 81 MG CHEWABLE TABLET 81 MG: 81 TABLET CHEWABLE at 08:03

## 2024-12-09 ASSESSMENT — PAIN SCALES - GENERAL
PAINLEVEL_OUTOF10: 6
PAINLEVEL_OUTOF10: 0

## 2024-12-09 ASSESSMENT — PAIN DESCRIPTION - DESCRIPTORS: DESCRIPTORS: DISCOMFORT;ACHING;SORE

## 2024-12-09 ASSESSMENT — PAIN DESCRIPTION - ORIENTATION: ORIENTATION: UPPER;LOWER

## 2024-12-09 ASSESSMENT — PAIN DESCRIPTION - PAIN TYPE: TYPE: CHRONIC PAIN

## 2024-12-09 ASSESSMENT — PAIN DESCRIPTION - LOCATION: LOCATION: BACK

## 2024-12-09 NOTE — PLAN OF CARE
Problem: Chronic Conditions and Co-morbidities  Goal: Patient's chronic conditions and co-morbidity symptoms are monitored and maintained or improved  12/9/2024 0853 by Claudine Nicholson RN  Outcome: Progressing  Flowsheets (Taken 12/9/2024 0800)  Care Plan - Patient's Chronic Conditions and Co-Morbidity Symptoms are Monitored and Maintained or Improved: Monitor and assess patient's chronic conditions and comorbid symptoms for stability, deterioration, or improvement  12/9/2024 0136 by Elly Jarvis RN  Outcome: Progressing  Flowsheets (Taken 12/8/2024 2000)  Care Plan - Patient's Chronic Conditions and Co-Morbidity Symptoms are Monitored and Maintained or Improved: Monitor and assess patient's chronic conditions and comorbid symptoms for stability, deterioration, or improvement     Problem: Discharge Planning  Goal: Discharge to home or other facility with appropriate resources  12/9/2024 0853 by Claudine Nicholson RN  Outcome: Progressing  Flowsheets (Taken 12/9/2024 0800)  Discharge to home or other facility with appropriate resources: Identify barriers to discharge with patient and caregiver  12/9/2024 0136 by Elly Jarvis RN  Outcome: Progressing  Flowsheets (Taken 12/8/2024 2000)  Discharge to home or other facility with appropriate resources: Identify barriers to discharge with patient and caregiver     Problem: Safety - Adult  Goal: Free from fall injury  12/9/2024 0853 by Claudine Nicholson RN  Outcome: Progressing  12/9/2024 0136 by Elly Jarvis RN  Outcome: Progressing  Flowsheets (Taken 12/8/2024 2000)  Free From Fall Injury: Instruct family/caregiver on patient safety

## 2024-12-09 NOTE — CONSULTS
Palliative Care Department  [phone]  Palliative Care Initial Consult  Paulette Russell APRN-CNS    Pelon Jarvis  [id number]  Hospital Day: 5    Date of Initial Consult: 12/8/24  Referring Provider: Dr. Fields    Palliative Medicine was consulted for assistance with: goals of care        HPI:   Pelon Jarvis is a 74 y.o. with a past medical history of  CKD, hypertension, carotid artery stenosis who was initially seen at [facility] and transferred admitted on 12/5/2024  with a CHIEF COMPLAINT of Stroke.  Pelon presented to [facility] with stroke symptoms; having left-sided weakness and difficulty with speech.  He was found to have R M1 occlusion.  He was transferred here due since he was a candidate for EVT for LVO.  He was found to have 100% occlusion of the right ICA.  Tandem thrombus in the right MCA M1 100% occlusion intracranial status post thrombectomy.  He had right ICA triple stent assisted angioplasty and recanalization.  He was admitted to ICU for further care; now with dysarthria. 12/7 MRI noted small area of hemorrhage. 12/6 CT head negative for Hemorrhage.Neurosurgery was consulted.     ASSESSMENT/PLAN:     Pertinent Hospital Diagnoses   Acute CVA (sp right MCA thrombectomy on 12/5)   Oropharyngeal dysphagia  OTTONIEL      Palliative Care Encounter / Counseling Regarding Goals of Care  Pelon Jarvis, Does have capacity for medical decision-making.  Capacity is time limited and situation/question specific  During encounter met with wife Lisa  surrogate medical decision-maker  Outcome of goals of care meeting:   Wants to continue current care  Wants to get further function ultimately to return back home  Limited code; no CPR; no defib; no intubation ; no resuscitative meds  Code status: Limited: no CPR; no defib; no intubation ; no resuscitative meds  Advanced Directives: no HPOA or Living Will noted in chart  Surrogate/Legal NOK:   Wife Lisa Flaherty @ [phone]    Daughter Sangita Myers  swelling  NEUROLOGICAL: Denies light headed, dizziness, loss of consciousness, left sided weakness    Physical Exam:  BP (!) 108/50   Pulse (!) 47   Temp 98.9 °F (37.2 °C) (Axillary)   Resp 20   Ht 1.753 m (5' 9.02\")   Wt 78 kg (171 lb 15.3 oz)   SpO2 98%   BMI 25.38 kg/m²   Gen:   NAD, awake, alert; slow to respond  HEENT:  Normocephalic, atraumatic, mucosa moist, EOMI  Neck:  Supple, trachea midline, no JVD  Lungs:  CTA bilaterally, no audible rhonchi or wheezes noted, respirations unlabored  Heart:  RRR, distant heart tones, no murmur, rub, or gallop noted during exam  Abd:  Soft, non tender, non distended, bowel sounds present  :  catheter  Ext:  left sided weakness, no edema, pulses present  Skin:  Warm and dry  Neuro:  PERRL, Alert, oriented x 3; following commands; slow to respond at times    Past Medical History:   Diagnosis Date    Arthritis 12/06/2024    Celiac disease 12/06/2024    Hypertension 12/06/2024    Peripheral neuropathy 12/06/2024     Past Surgical History:   Procedure Laterality Date    APPENDECTOMY      IR MECHANICAL ART THROMBECTOMY INTRACRANIAL  12/5/2024    IR MECHANICAL ART THROMBECTOMY INTRACRANIAL 12/5/2024 Tate Tran MD [facility]    NOSE SURGERY      TONSILLECTOMY       Active Hospital Problems    Diagnosis Date Noted    Hypotension due to hypovolemia [E86.1] 12/07/2024    Cerebrovascular accident (CVA) (HCC) [I63.9] 12/05/2024       Social History:   The patient currently lives at home  TOBACCO:  reports that he has quit smoking. He has never used smokeless tobacco.  ETOH:  reports no history of alcohol use.      Objective data reviewed: labs, images, records, medication use, vitals, and chart    Discussed patient and the plan of care with the other IDT members: Palliative Medicine IDT Team, Floor Nurse, Patient, and Family    Time/Communication  Greater than 50% of time spent, total 45 minutes in counseling and coordination of care at the bedside

## 2024-12-09 NOTE — PROGRESS NOTES
Physical Therapy  Physical Therapy Initial Assessment     Name: Pelon Jarvis  : 1950  MRN: [id number]      Date of Service: 2024    Evaluating PT:  Skinny Aparicio PT, DPT [id number]    Room #:  3820/3820-A  Diagnosis:  Acute CVA (cerebrovascular accident) ([facility]) [I63.9]  PMHx/PSHx:    Past Medical History:   Diagnosis Date    Arthritis 2024    Celiac disease 2024    Hypertension 2024    Peripheral neuropathy 2024     Procedure/Surgery:   Right MCA , Right ICA stenting   Precautions:  Falls, L hemiparesis, L tone, corpak, aphasia, latent, alarms, SBP < 140  Equipment Needs:  TBD    SUBJECTIVE:    Pt lives alone in a 2 story home with 2 step(s) to enter, per chart.   Pt ambulated without device and was independent PTA.    OBJECTIVE:   Initial Evaluation  Date: 24 Treatment Short Term/ Long Term   Goals   AM-PAC 6 Clicks      Was pt agreeable to Eval/treatment? Yes     Does pt have pain? No c/o pain     Bed Mobility  Rolling: MaxA  Supine to sit: MaxA x 2 with HOB elevated  Sit to supine: MaxA x 2  Scooting: MaxA  Cristina   Transfers Sit to stand: NT  Stand to sit: NT  Stand pivot: NT  Cristina with AAD   Ambulation   NT  >15 feet with Cristina with AAD   Stair negotiation: ascended and descended NT     ROM BUE:  Defer to OT note  BLE:  WFL     Strength BUE:  Defer to OT note  RLE:  4/5  LLE:  0/5  Increase by 1/3 MMT grade   Balance Sitting EOB:  ModA to MaxA  Dynamic Standing:  NT  Sitting EOB:  Independent  Dynamic Standing:  Cristina with AAD     Pt is A & O x 4  CAM-ICU: NT  RASS: 0  Sensation:  appeared to be intact  Edema:  none    Vitals:  Heart Rate at rest 60 bpm Heart Rate post session 54 bpm   SpO2 at rest 99% SpO2 post session 99%   Blood Pressure at rest 109/46 mmHg Blood Pressure post session 95/55 mmHg   BP at /41    Functional Status Score-Intensive Care Unit (FSS-ICU)   Rolling -/   Supine to sit transfer 1/   Unsupported sitting  2/7   Sit to stand transfers /  treat  Diagnosis:  Acute CVA (cerebrovascular accident) ([facility]) [I63.9]  Specific instructions for next treatment:  Progress activity    Current Treatment Recommendations:     [x] Strengthening to improve independence with functional mobility   [x] ROM to improve independence with functional mobility   [x] Balance Training to improve static/dynamic balance and to reduce fall risk  [x] Endurance Training to improve activity tolerance during functional mobility   [x] Transfer Training to improve safety and independence with all functional transfers   [x] Gait Training to improve gait mechanics, endurance and asses need for appropriate assistive device  [] Stair Training in preparation for safe discharge home and/or into the community   [x] Positioning to prevent skin breakdown and contractures  [x] Safety and Education Training   [x] Patient/Caregiver Education   [] HEP  [] Other     PT long term treatment goals are located in above grid    Frequency of treatments: 2-5x/week x 1-2 weeks.    Time in  0915  Time out  0942    Total Treatment Time  12 minutes     Evaluation Time includes thorough review of current medical information, gathering information on past medical history/social history and prior level of function, completion of standardized testing/informal observation of tasks, assessment of data and education on plan of care and goals.    CPT codes:  [] Low Complexity PT evaluation 10989  [x] Moderate Complexity PT evaluation 50496  [] High Complexity PT evaluation 93912  [] PT Re-evaluation 24126  [] Gait training 39468 - minutes  [] Manual therapy 58946 - minutes  [x] Therapeutic activities 50116 12 minutes  [] Therapeutic exercises 93986 - minutes  [] Neuromuscular reeducation 93047 - minutes     Skinny Aparicio, PT, DPT  [id number]

## 2024-12-09 NOTE — PROGRESS NOTES
OCCUPATIONAL THERAPY INITIAL EVALUATION    [facility]  [address], [place]       Date:2024                                                               Patient Name: Pelon Jarvis  MRN: [id number]  : 1950  Room: [address] [place] [address]-A    Evaluating OT: Ana Johnson, OTR/L [id number]    Referring Provider:   izing   Tate Tran MD      Specific Provider Orders/Date: OT eval and treat (24)        Diagnosis: Acute CVA (cerebrovascular accident) (HCC) [I63.9]            Reason for admission:   Presented to outside hospital with confusion and left side weakness that began the night prior. He was found to have a right MCA CVA. He was transferred to [facility] for potential IR intervention. In IR he had thrombectomy of the MCA and triple stent placed in right ICA. He was started on an integrelin gtt and admitted to IC.     Surgery/Procedures:  Right MCA , Right ICA stenting      Pertinent Medical History:    Past Medical History:   Diagnosis Date    Arthritis 2024    Celiac disease 2024    Hypertension 2024    Peripheral neuropathy 2024      *Precautions:  Fall Risk, alarms, L hemiparesis, L tone, aphasia, latent responses, SBP<140, NPO/corpak    Assessment of current deficits   [x] Functional mobility  [x]ADLs  [x] Strength               []Cognition   [x] Functional transfers   [x] IADLs         [x] Safety Awareness   [x]Endurance   [x] Fine Coordination        [x] ROM     [] Vision/perception   []Sensation    [x]Gross Motor Coordination [x] Balance   [] Delirium                  [x]Motor Control  [] Communication    OT PLAN OF CARE   OT POC based on physician orders, patient diagnosis and results of clinical assessment.       Frequency/Duration: 1-3 days/wk for 1-2 weeks PRN    Specific OT Treatment to include:   ADL retraining/adapted techniques and AE recommendations to increase functional

## 2024-12-09 NOTE — PROGRESS NOTES
Intensive Care Unit  Critical Care Consult  Daily Progress Note 12/9/2024    Date of Admission: 12/5    Chief Complaint   Patient presents with    Cerebrovascular Accident     Pt transfer from [facility] for thrombectomy .         EVENTS:   Presented to outside hospital with confusion and left side weakness that began the night prior. He was found to have a right MCA CVA. He was transferred to [facility] for potential IR intervention. In IR he had thrombectomy of the MCA and triple stent placed in right ICA. He was started on an integrelin gtt and admitted to [facility].     12/6 no changes overnight. He remains on the Integrelin. He developed hypotension and bradycardia; he received a dose of atropine for the bradycardia with goal MAP of greater than 50 mmHg per Dr. Feliciano .   12/7 MRI completed, small area of hemorrhagic conversion identified. Neurosurgery consulted. Hold ASA and Brilinta until seen by neurosurgery and neurology.   UPDATE: CT completed, no hemorrhage identified. Aspirin and Brilinta unheld.      12/8 no events overnight. NG placed late afternoon after initially refusing, now on tube feeds  12/9 no acute events, VSS, afebrile, alert, CGS 15, left sided weakness     PHYSICAL EXAM:    BP (!) 108/50   Pulse (!) 47   Temp 98.9 °F (37.2 °C) (Axillary)   Resp 20   Ht 1.753 m (5' 9.02\")   Wt 78 kg (171 lb 15.3 oz)   SpO2 98%   BMI 25.38 kg/m²     General appearance:  Comfortable.     Pain Description: none    GCS:  15      Pupil size:  Left 3 mm  Right 3 mm  Pupil reaction: Yes  Wiggles fingers: Left No Right Yes  Hand grasp:   Left absent     Right normal  Wiggles toes: Left absent    Right Yes  Plantar flexion: Left absent    Right normal    CONSTITUTIONAL: no acute distress, lying in hospital bed, alert,   NEUROLOGIC: PERRL, oriented x 4, left sided weakness  CARDIOVASCULAR: S1 S2, regular rate, regular rhythm, no murmur/gallop/rub. Monitor: sinus donnie  PULMONARY: no rhonchi/rales/wheezes, no use of

## 2024-12-09 NOTE — CARE COORDINATION
Spoke with patient and spouse at bedside. Patient delayed but appropriate with responses. Discussed rehab at discharge. Choiced for 1. [facility], they are following for now, need patient to be on an oral diet to accept and make some progress with PT/OT, 2. [facility]. Spouse worked there for many years and knows manager. I asked the patient about his SS#, he said he knows it but doesn't want to give it out. Discussed with him that we use this for insurance approval, he still does not feel comfortable giving it out today. Ambulance placed on soft chart.     For questions I can be reached at [phone]. Yamile Oviedo, KASSIE    The Plan for Transition of Care is related to the following treatment goals: discharge planning when stable     The Patient and/or patient representative Pelon Jarvis and Lisa Flaherty was provided with a choice of provider and agrees   with the discharge plan. [x] Yes [] No    Freedom of choice list was provided with basic dialogue that supports the patient's individualized plan of care/goals, treatment preferences and shares the quality data associated with the providers. [x] Yes [] No

## 2024-12-09 NOTE — PROGRESS NOTES
Hospitalist Progress Note      Chief Complaint:  had concerns including Cerebrovascular Accident (Pt transfer from [facility] for thrombectomy . ).    Admission Date: 2024     SYNOPSIS:Pelon Jarvis has a past medical history that includes CKD, carotid artery stenosis presented to [facility] with stroke like symptoms.  He was having left-sided weakness and difficulty with speech.  He initially had NIH of 23. He was found to have R M1 occlusion.  He was transferred ER to ER due to the patient being a candidate for EVT for LVO.   He was found to have 100% occlusion of the right ICA.  Tandem thrombus in the right MCA M1 100% occlusion intracranial status post thrombectomy.  He had right ICA triple stent assisted angioplasty and recanalization.  He was admitted to ICU.      : Hypotensive, received IV fluids, To keep MAP >50     : MRI showing small area of hemorrhage, Neurosurgery consulted ASA, sueta held until seen by neurosurgery, Repeat CT head   BP improved, Cr: 1.6     : Cr improved to 1.3, CT head negative for Hemorrhage, Continue DAPT    SUBJECTIVE:    Patient seen and examined at bedside, has weakness on the left side, Records reviewed.   Stable overnight. No overnight issues reported.    Temp (24hrs), Av.9 °F (37.2 °C), Min:98.6 °F (37 °C), Max:99.3 °F (37.4 °C)    DIET: Diet NPO  ADULT TUBE FEEDING; Nasoenteric; Standard with Fiber; Continuous; 10; Yes; 10; Q 8 hours; 45; 200; Q 4 hours; Protein; 1 Dose; Daily  CODE: Limited    Intake/Output Summary (Last 24 hours) at 2024 1153  Last data filed at 2024 1000  Gross per 24 hour   Intake 3566.66 ml   Output 2000 ml   Net 1566.66 ml       OBJECTIVE:    BP (!) 135/44   Pulse 54   Temp 98.6 °F (37 °C)   Resp 24   Ht 1.753 m (5' 9.02\")   Wt 78 kg (171 lb 15.3 oz)   SpO2 98%   BMI 25.38 kg/m²     General appearance: No apparent distress, appears stated age and cooperative.   HEENT:  Normocephalic.

## 2024-12-09 NOTE — PROGRESS NOTES
Palliative Medicine Social Work     Patient Name: Pelon Jarvis    Age: 74 y.o.    Marital Status: , pt and Lisa were  2 months ago    [place] Status: unknown    Next of Kin: wife Lisa, and his daughter Sangita    Additional Support: no    Minor Children: no    Advanced Directives: none on file, requested copy form wife is any advance directives are located. She had thought his daughter Sangita would be the decision maker since Lisa and pt have only been  2 months. Explained hierarchy of decision makers.     Confirm Code Status: limited no to all,     Current Goals of Care: pt would like to continue all care but if he were to decline he stases \" I want to go naturally\"    Mental Health History: no    Substance Abuse:no    Indications of Abuse/Neglect: no    Financial Concerns: no    Living Situation: resides with his wife and was independent     Physical Care Needs Met: yes    Emotional Needs Met: supportive listening provided to pts wife at bedside.    Assessment: 74 year old  male transferred to [facility] from [place] for thrombectomy following a CVA. Pt is alert, oriented and able to communicate. He moves his right side but no left. HE will need rehab.

## 2024-12-09 NOTE — PLAN OF CARE
Problem: Chronic Conditions and Co-morbidities  Goal: Patient's chronic conditions and co-morbidity symptoms are monitored and maintained or improved  Outcome: Progressing  Flowsheets (Taken 12/8/2024 2000)  Care Plan - Patient's Chronic Conditions and Co-Morbidity Symptoms are Monitored and Maintained or Improved: Monitor and assess patient's chronic conditions and comorbid symptoms for stability, deterioration, or improvement     Problem: Discharge Planning  Goal: Discharge to home or other facility with appropriate resources  Outcome: Progressing  Flowsheets (Taken 12/8/2024 2000)  Discharge to home or other facility with appropriate resources: Identify barriers to discharge with patient and caregiver     Problem: Safety - Adult  Goal: Free from fall injury  Outcome: Progressing  Flowsheets (Taken 12/8/2024 2000)  Free From Fall Injury: Instruct family/caregiver on patient safety     Problem: Skin/Tissue Integrity  Goal: Absence of new skin breakdown  Description: 1.  Monitor for areas of redness and/or skin breakdown  2.  Assess vascular access sites hourly  3.  Every 4-6 hours minimum:  Change oxygen saturation probe site  4.  Every 4-6 hours:  If on nasal continuous positive airway pressure, respiratory therapy assess nares and determine need for appliance change or resting period.  Outcome: Progressing     Problem: ABCDS Injury Assessment  Goal: Absence of physical injury  Outcome: Progressing  Flowsheets (Taken 12/8/2024 2000)  Absence of Physical Injury: Implement safety measures based on patient assessment     Problem: Neurosensory - Adult  Goal: Achieves stable or improved neurological status  Outcome: Progressing  Flowsheets (Taken 12/8/2024 2000)  Achieves stable or improved neurological status: Assess for and report changes in neurological status  Goal: Absence of seizures  Outcome: Progressing  Flowsheets (Taken 12/8/2024 2000)  Absence of seizures: Monitor for seizure activity.  If seizure occurs,

## 2024-12-10 LAB
ALBUMIN SERPL-MCNC: 2.9 G/DL (ref 3.5–5.2)
ALP SERPL-CCNC: 72 U/L (ref 40–129)
ALT SERPL-CCNC: 30 U/L (ref 0–40)
ANION GAP SERPL CALCULATED.3IONS-SCNC: 6 MMOL/L (ref 7–16)
AST SERPL-CCNC: 35 U/L (ref 0–39)
BILIRUB SERPL-MCNC: 0.9 MG/DL (ref 0–1.2)
BUN SERPL-MCNC: 18 MG/DL (ref 6–23)
CALCIUM SERPL-MCNC: 8.5 MG/DL (ref 8.6–10.2)
CHLORIDE SERPL-SCNC: 108 MMOL/L (ref 98–107)
CO2 SERPL-SCNC: 27 MMOL/L (ref 22–29)
CREAT SERPL-MCNC: 1.5 MG/DL (ref 0.7–1.2)
ERYTHROCYTE [DISTWIDTH] IN BLOOD BY AUTOMATED COUNT: 12.4 % (ref 11.5–15)
GFR, ESTIMATED: 47 ML/MIN/1.73M2
GLUCOSE SERPL-MCNC: 110 MG/DL (ref 74–99)
HCT VFR BLD AUTO: 30.3 % (ref 37–54)
HGB BLD-MCNC: 10.2 G/DL (ref 12.5–16.5)
MAGNESIUM SERPL-MCNC: 2 MG/DL (ref 1.6–2.6)
MCH RBC QN AUTO: 34.7 PG (ref 26–35)
MCHC RBC AUTO-ENTMCNC: 33.7 G/DL (ref 32–34.5)
MCV RBC AUTO: 103.1 FL (ref 80–99.9)
PHOSPHATE SERPL-MCNC: 3.8 MG/DL (ref 2.5–4.5)
PLATELET # BLD AUTO: 154 K/UL (ref 130–450)
PMV BLD AUTO: 11.4 FL (ref 7–12)
POTASSIUM SERPL-SCNC: 4 MMOL/L (ref 3.5–5)
PROT SERPL-MCNC: 5.3 G/DL (ref 6.4–8.3)
RBC # BLD AUTO: 2.94 M/UL (ref 3.8–5.8)
SODIUM SERPL-SCNC: 141 MMOL/L (ref 132–146)
WBC OTHER # BLD: 9.7 K/UL (ref 4.5–11.5)

## 2024-12-10 PROCEDURE — 99232 SBSQ HOSP IP/OBS MODERATE 35: CPT | Performed by: STUDENT IN AN ORGANIZED HEALTH CARE EDUCATION/TRAINING PROGRAM

## 2024-12-10 PROCEDURE — 6370000000 HC RX 637 (ALT 250 FOR IP): Performed by: CLINICAL NURSE SPECIALIST

## 2024-12-10 PROCEDURE — 6370000000 HC RX 637 (ALT 250 FOR IP): Performed by: NURSE PRACTITIONER

## 2024-12-10 PROCEDURE — 6370000000 HC RX 637 (ALT 250 FOR IP): Performed by: SURGERY

## 2024-12-10 PROCEDURE — 6370000000 HC RX 637 (ALT 250 FOR IP)

## 2024-12-10 PROCEDURE — 36415 COLL VENOUS BLD VENIPUNCTURE: CPT

## 2024-12-10 PROCEDURE — 2580000003 HC RX 258: Performed by: PSYCHIATRY & NEUROLOGY

## 2024-12-10 PROCEDURE — 83735 ASSAY OF MAGNESIUM: CPT

## 2024-12-10 PROCEDURE — 6360000002 HC RX W HCPCS: Performed by: STUDENT IN AN ORGANIZED HEALTH CARE EDUCATION/TRAINING PROGRAM

## 2024-12-10 PROCEDURE — 2580000003 HC RX 258: Performed by: STUDENT IN AN ORGANIZED HEALTH CARE EDUCATION/TRAINING PROGRAM

## 2024-12-10 PROCEDURE — 84100 ASSAY OF PHOSPHORUS: CPT

## 2024-12-10 PROCEDURE — 85027 COMPLETE CBC AUTOMATED: CPT

## 2024-12-10 PROCEDURE — 2060000000 HC ICU INTERMEDIATE R&B

## 2024-12-10 PROCEDURE — 80053 COMPREHEN METABOLIC PANEL: CPT

## 2024-12-10 PROCEDURE — 6370000000 HC RX 637 (ALT 250 FOR IP): Performed by: STUDENT IN AN ORGANIZED HEALTH CARE EDUCATION/TRAINING PROGRAM

## 2024-12-10 RX ORDER — SODIUM CHLORIDE 9 MG/ML
INJECTION, SOLUTION INTRAVENOUS CONTINUOUS
Status: ACTIVE | OUTPATIENT
Start: 2024-12-10 | End: 2024-12-10

## 2024-12-10 RX ADMIN — ATORVASTATIN CALCIUM 40 MG: 40 TABLET, FILM COATED ORAL at 22:25

## 2024-12-10 RX ADMIN — TICAGRELOR 90 MG: 90 TABLET ORAL at 08:54

## 2024-12-10 RX ADMIN — ENOXAPARIN SODIUM 40 MG: 100 INJECTION SUBCUTANEOUS at 09:02

## 2024-12-10 RX ADMIN — POLYVINYL ALCOHOL, POVIDONE 1 DROP: 14; 6 SOLUTION/ DROPS OPHTHALMIC at 11:11

## 2024-12-10 RX ADMIN — FAMOTIDINE 20 MG: 20 TABLET, FILM COATED ORAL at 22:25

## 2024-12-10 RX ADMIN — ASPIRIN 81 MG CHEWABLE TABLET 81 MG: 81 TABLET CHEWABLE at 08:54

## 2024-12-10 RX ADMIN — Medication 250 MG: at 09:09

## 2024-12-10 RX ADMIN — POLYETHYLENE GLYCOL 3350 17 G: 17 POWDER, FOR SOLUTION ORAL at 08:54

## 2024-12-10 RX ADMIN — MODAFINIL 50 MG: 100 TABLET ORAL at 09:02

## 2024-12-10 RX ADMIN — SODIUM CHLORIDE: 9 INJECTION, SOLUTION INTRAVENOUS at 11:15

## 2024-12-10 RX ADMIN — SODIUM CHLORIDE, PRESERVATIVE FREE 10 ML: 5 INJECTION INTRAVENOUS at 08:53

## 2024-12-10 RX ADMIN — FAMOTIDINE 20 MG: 20 TABLET, FILM COATED ORAL at 08:54

## 2024-12-10 RX ADMIN — Medication 250 MG: at 16:39

## 2024-12-10 RX ADMIN — Medication 250 MG: at 13:49

## 2024-12-10 RX ADMIN — ACETAMINOPHEN 650 MG: 325 TABLET ORAL at 14:24

## 2024-12-10 RX ADMIN — Medication 250 MG: at 22:26

## 2024-12-10 RX ADMIN — TICAGRELOR 90 MG: 90 TABLET ORAL at 22:25

## 2024-12-10 RX ADMIN — Medication 1 APPLICATION: at 11:10

## 2024-12-10 ASSESSMENT — PAIN SCALES - GENERAL
PAINLEVEL_OUTOF10: 3
PAINLEVEL_OUTOF10: 0
PAINLEVEL_OUTOF10: 0

## 2024-12-10 ASSESSMENT — PAIN DESCRIPTION - LOCATION: LOCATION: BACK;NECK;HIP

## 2024-12-10 ASSESSMENT — PAIN DESCRIPTION - DESCRIPTORS: DESCRIPTORS: ACHING

## 2024-12-10 ASSESSMENT — PAIN DESCRIPTION - ORIENTATION: ORIENTATION: LEFT;RIGHT

## 2024-12-10 NOTE — FLOWSHEET NOTE
1855 Transferred to [facility] via bed on monitor with transporter and RN.Spouse present.

## 2024-12-10 NOTE — PLAN OF CARE
Problem: Safety - Adult  Goal: Free from fall injury  12/10/2024 0956 by Hanna King RN  Outcome: Progressing  12/9/2024 2148 by Elly Jarvis RN  Outcome: Progressing  Flowsheets (Taken 12/9/2024 2000)  Free From Fall Injury: Instruct family/caregiver on patient safety     Problem: Skin/Tissue Integrity  Goal: Absence of new skin breakdown  Description: 1.  Monitor for areas of redness and/or skin breakdown  2.  Assess vascular access sites hourly  3.  Every 4-6 hours minimum:  Change oxygen saturation probe site  4.  Every 4-6 hours:  If on nasal continuous positive airway pressure, respiratory therapy assess nares and determine need for appliance change or resting period.  12/10/2024 0956 by Hanna King RN  Outcome: Progressing  12/9/2024 2148 by Elly Jarvis RN  Outcome: Progressing     Problem: Neurosensory - Adult  Goal: Achieves stable or improved neurological status  12/10/2024 0956 by Hanna King RN  Outcome: Progressing  12/9/2024 2148 by Elly Jarvis RN  Outcome: Progressing  Flowsheets  Taken 12/9/2024 2000 by Elly Jarvis RN  Achieves stable or improved neurological status:   Assess for and report changes in neurological status   Initiate measures to prevent increased intracranial pressure  Taken 12/9/2024 0800 by Claudine Nicholson RN  Achieves stable or improved neurological status: Assess for and report changes in neurological status  Goal: Absence of seizures  12/10/2024 0956 by Hanna King RN  Outcome: Progressing  12/9/2024 2148 by Elly Jarvis RN  Outcome: Progressing  Flowsheets  Taken 12/9/2024 2000 by Elly Jarvis RN  Absence of seizures: Monitor for seizure activity.  If seizure occurs, document type and location of movements and any associated apnea  Taken 12/9/2024 0800 by Claudine Nicholson RN  Absence of seizures: Monitor for seizure activity.  If seizure occurs, document type and location of movements and any associated apnea  Goal: Remains

## 2024-12-10 NOTE — PROGRESS NOTES
Palliative Care Department  [phone]  Palliative Care Initial Consult  Paulette Russell APRN-CNS    Pelon Jarvis  [id number]  Hospital Day: 6    Date of Initial Consult: 12/8/24    SUBJECTIVE:     Details of Conversation:     12/10/24 Chart reviewed. Pt continues on tube feeding.   Code has been established: limited no to CPR, defibrillation, no intubation or meds.   Goals are established-plan for rehab.  Palliative Medicine will now sign off.  No further needs at this time.  Feel free to re consult if needed.

## 2024-12-10 NOTE — PLAN OF CARE
Problem: Chronic Conditions and Co-morbidities  Goal: Patient's chronic conditions and co-morbidity symptoms are monitored and maintained or improved  12/9/2024 2148 by Elly Jarvis RN  Outcome: Progressing  Flowsheets (Taken 12/9/2024 2000)  Care Plan - Patient's Chronic Conditions and Co-Morbidity Symptoms are Monitored and Maintained or Improved: Monitor and assess patient's chronic conditions and comorbid symptoms for stability, deterioration, or improvement  12/9/2024 0853 by Claudine Nicholson RN  Outcome: Progressing  Flowsheets (Taken 12/9/2024 0800)  Care Plan - Patient's Chronic Conditions and Co-Morbidity Symptoms are Monitored and Maintained or Improved: Monitor and assess patient's chronic conditions and comorbid symptoms for stability, deterioration, or improvement     Problem: Discharge Planning  Goal: Discharge to home or other facility with appropriate resources  12/9/2024 2148 by Elly Jarvis RN  Outcome: Progressing  Flowsheets (Taken 12/9/2024 2000)  Discharge to home or other facility with appropriate resources: Identify barriers to discharge with patient and caregiver  12/9/2024 0853 by Claudine Nicholson RN  Outcome: Progressing  Flowsheets (Taken 12/9/2024 0800)  Discharge to home or other facility with appropriate resources: Identify barriers to discharge with patient and caregiver     Problem: Safety - Adult  Goal: Free from fall injury  12/9/2024 2148 by Elly Jarvis RN  Outcome: Progressing  Flowsheets (Taken 12/9/2024 2000)  Free From Fall Injury: Instruct family/caregiver on patient safety  12/9/2024 0853 by Claudine Nicholson RN  Outcome: Progressing     Problem: Skin/Tissue Integrity  Goal: Absence of new skin breakdown  Description: 1.  Monitor for areas of redness and/or skin breakdown  2.  Assess vascular access sites hourly  3.  Every 4-6 hours minimum:  Change oxygen saturation probe site  4.  Every 4-6 hours:  If on nasal continuous positive airway pressure, respiratory therapy assess

## 2024-12-10 NOTE — PROGRESS NOTES
Spiritual Health History and Assessment/Progress Note  [facility] Deya Faxon    (P) Spiritual/Emotional Needs,  ,  ,      Name: Pelon Jarvis MRN: [id number]    Age: 74 y.o.     Sex: male   Language: English   Synagogue: [employer]   Cerebrovascular accident (CVA) ([facility])     Date: 12/10/2024                           Spiritual Assessment began in [facility]        Referral/Consult From: (P) Rounding   Encounter Overview/Reason: (P) Spiritual/Emotional Needs  Service Provided For: (P) Patient    Juana, Belief, Meaning:   Patient unable to assess at this time  Family/Friends are connected with a juana tradition or spiritual practice      Importance and Influence:  Patient unable to assess at this time  Family/Friends have spiritual/personal beliefs that influence decisions regarding the patient's health    Community:  Patient Other: unnable to assess  Family/Friends feel well-supported. Support system includes: Extended family    Assessment and Plan of Care:     Patient Interventions include: Other: unable to assess  Family/Friends Interventions include: Facilitated expression of thoughts and feelings    Patient Plan of Care: No spiritual needs identified for follow-up  Family/Friends Plan of Care: No spiritual needs identified for follow-up    Patient was asleep.  Talked briefly with his wife.  Assured her of our prayers and support.    Electronically signed by Chaplain VARINDER on 12/10/2024 at 5:50 PM

## 2024-12-10 NOTE — PROGRESS NOTES
Conjunctivae/corneas clear. Moist mucosa   Neck: Supple. No jugular venous distention. Thyroid not visible, non tender   Respiratory: Normal respiratory effort. Clear to auscultation bilaterally. No stridor/wheezing/rhonchi/crackles   Cardiovascular: Regular heart beats, normal S1-S2. No M/G/R  Abdomen: Non distended, soft, non tender, no visceromegaly, no mass, normal bowel sounds   Musculoskeletal: No clubbing, cyanosis, no bilateral lower extremity edema. Brisk capillary refill.   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 daily     Oropharyngeal dysphagia: NPO, Speech consulted, Corpak placement for feeding, To evaluate swallow fxn when patient more awake, tolerating tubefeeds well     Hypotension: Improved     OTTONIEL: Likely 2/2 Hypotension and decreased perfusion, creatinine increased to 1.6, Improved , has good urine output, monitor urine output if oliguria or worsening labs will need Nephrology evaluation,Monitor in am labs, continue IV fluids     DVT prophylaxis: Lovenox    Palliative on Board family changed CODE status to limited     DISPOSITION: Plan for Floor transfer, Discharge plan is Rehab appreciate CM assistance in DC planning    Medications:  REVIEWED DAILY    Infusion Medications    sodium chloride 75 mL/hr at 12/10/24 1115    sodium chloride       Scheduled Medications    famotidine  20 mg Per NG tube BID    modafinil  50 mg Per NG tube Daily    polyethylene glycol  17 g Per NG tube Daily    enoxaparin  40 mg SubCUTAneous Daily    aspirin  81 mg Per NG tube Daily    atorvastatin  40 mg Per NG tube Nightly    ticagrelor  90 mg Per NG tube  BID    potassium & sodium phosphates  1 packet Per NG tube 4x Daily    sodium chloride flush  5-40 mL IntraVENous 2 times per day     PRN Meds: white petrolatum, Carmex Classic Lip Balm, acetaminophen, Polyvinyl Alcohol-Povidone PF, hydrALAZINE, sodium chloride flush, sodium chloride, ondansetron **OR** ondansetron, labetalol    Labs:     Recent Labs     12/08/24  0520 12/09/24  0436 12/10/24  0443   WBC 13.9* 11.1 9.7   HGB 11.5* 10.2* 10.2*   HCT 34.3* 31.0* 30.3*     --  154       Recent Labs     12/08/24  0520 12/08/24  1340 12/08/24  2115 12/09/24  0436 12/10/24  0443      < > 140 140 141   K 3.7   < > 3.9 3.8 4.0      < > 111* 110* 108*   CO2 20*   < > 22 26 27   BUN 18   < > 19 18 18   CREATININE 1.3*   < > 1.4* 1.4* 1.5*   CALCIUM 8.3*   < > 8.2* 8.0* 8.5*   PHOS 2.2*  --   --  2.8 3.8    < > = values in this interval not displayed.       Recent Labs     12/08/24  0520 12/09/24  0436 12/10/24  0443   ALKPHOS 65 66 72   ALT 22 32 30   AST 54* 50* 35   BILITOT 1.2 0.8 0.9       No results for input(s): \"INR\" in the last 72 hours.      No results for input(s): \"CKTOTAL\", \"TROPONINI\" in the last 72 hours.    Chronic labs:    Lab Results   Component Value Date    CHOL 167 12/06/2024    TRIG 74 12/06/2024    HDL 50 12/06/2024    TSH 0.86 12/06/2024    INR 1.1 12/05/2024    LABA1C 5.6 12/06/2024       Radiology: REVIEWED DAILY    +++++++++++++++++++++++++++++++++++++++++++++++++  Bella Yi MD  [place], [place]  +++++++++++++++++++++++++++++++++++++++++++++++++  NOTE: This report was transcribed using voice recognition software. Every effort was made to ensure accuracy; however, inadvertent computerized transcription errors may be present.

## 2024-12-11 ENCOUNTER — APPOINTMENT (OUTPATIENT)
Dept: GENERAL RADIOLOGY | Age: 74
DRG: 023 | End: 2024-12-11
Payer: MEDICARE

## 2024-12-11 LAB
ALBUMIN SERPL-MCNC: 3.1 G/DL (ref 3.5–5.2)
ALP SERPL-CCNC: 78 U/L (ref 40–129)
ALT SERPL-CCNC: 34 U/L (ref 0–40)
ANION GAP SERPL CALCULATED.3IONS-SCNC: 6 MMOL/L (ref 7–16)
AST SERPL-CCNC: 39 U/L (ref 0–39)
BILIRUB SERPL-MCNC: 1.1 MG/DL (ref 0–1.2)
BUN SERPL-MCNC: 17 MG/DL (ref 6–23)
CALCIUM SERPL-MCNC: 8.4 MG/DL (ref 8.6–10.2)
CHLORIDE SERPL-SCNC: 104 MMOL/L (ref 98–107)
CO2 SERPL-SCNC: 26 MMOL/L (ref 22–29)
CREAT SERPL-MCNC: 1.3 MG/DL (ref 0.7–1.2)
ERYTHROCYTE [DISTWIDTH] IN BLOOD BY AUTOMATED COUNT: 12.2 % (ref 11.5–15)
GFR, ESTIMATED: 56 ML/MIN/1.73M2
GLUCOSE SERPL-MCNC: 120 MG/DL (ref 74–99)
HCT VFR BLD AUTO: 33.7 % (ref 37–54)
HGB BLD-MCNC: 11.1 G/DL (ref 12.5–16.5)
MAGNESIUM SERPL-MCNC: 2 MG/DL (ref 1.6–2.6)
MCH RBC QN AUTO: 34 PG (ref 26–35)
MCHC RBC AUTO-ENTMCNC: 32.9 G/DL (ref 32–34.5)
MCV RBC AUTO: 103.4 FL (ref 80–99.9)
PHOSPHATE SERPL-MCNC: 3.5 MG/DL (ref 2.5–4.5)
PLATELET # BLD AUTO: 191 K/UL (ref 130–450)
PMV BLD AUTO: 11.2 FL (ref 7–12)
POTASSIUM SERPL-SCNC: 4.1 MMOL/L (ref 3.5–5)
PROT SERPL-MCNC: 5.8 G/DL (ref 6.4–8.3)
RBC # BLD AUTO: 3.26 M/UL (ref 3.8–5.8)
SODIUM SERPL-SCNC: 136 MMOL/L (ref 132–146)
WBC OTHER # BLD: 10.8 K/UL (ref 4.5–11.5)

## 2024-12-11 PROCEDURE — 85027 COMPLETE CBC AUTOMATED: CPT

## 2024-12-11 PROCEDURE — 6370000000 HC RX 637 (ALT 250 FOR IP): Performed by: SURGERY

## 2024-12-11 PROCEDURE — 36415 COLL VENOUS BLD VENIPUNCTURE: CPT

## 2024-12-11 PROCEDURE — 6370000000 HC RX 637 (ALT 250 FOR IP): Performed by: CLINICAL NURSE SPECIALIST

## 2024-12-11 PROCEDURE — 6370000000 HC RX 637 (ALT 250 FOR IP): Performed by: NURSE PRACTITIONER

## 2024-12-11 PROCEDURE — 97535 SELF CARE MNGMENT TRAINING: CPT

## 2024-12-11 PROCEDURE — 74230 X-RAY XM SWLNG FUNCJ C+: CPT

## 2024-12-11 PROCEDURE — 97530 THERAPEUTIC ACTIVITIES: CPT

## 2024-12-11 PROCEDURE — 80053 COMPREHEN METABOLIC PANEL: CPT

## 2024-12-11 PROCEDURE — 92611 MOTION FLUOROSCOPY/SWALLOW: CPT

## 2024-12-11 PROCEDURE — 84100 ASSAY OF PHOSPHORUS: CPT

## 2024-12-11 PROCEDURE — 83735 ASSAY OF MAGNESIUM: CPT

## 2024-12-11 PROCEDURE — 92526 ORAL FUNCTION THERAPY: CPT

## 2024-12-11 PROCEDURE — 97112 NEUROMUSCULAR REEDUCATION: CPT

## 2024-12-11 PROCEDURE — 6370000000 HC RX 637 (ALT 250 FOR IP)

## 2024-12-11 PROCEDURE — 99232 SBSQ HOSP IP/OBS MODERATE 35: CPT | Performed by: INTERNAL MEDICINE

## 2024-12-11 PROCEDURE — 2060000000 HC ICU INTERMEDIATE R&B

## 2024-12-11 PROCEDURE — 2500000003 HC RX 250 WO HCPCS: Performed by: PHYSICIAN ASSISTANT

## 2024-12-11 RX ADMIN — TICAGRELOR 90 MG: 90 TABLET ORAL at 09:57

## 2024-12-11 RX ADMIN — ASPIRIN 81 MG CHEWABLE TABLET 81 MG: 81 TABLET CHEWABLE at 09:57

## 2024-12-11 RX ADMIN — BARIUM SULFATE 15 ML: 400 SUSPENSION ORAL at 14:35

## 2024-12-11 RX ADMIN — Medication 250 MG: at 16:01

## 2024-12-11 RX ADMIN — ATORVASTATIN CALCIUM 40 MG: 40 TABLET, FILM COATED ORAL at 21:07

## 2024-12-11 RX ADMIN — TICAGRELOR 90 MG: 90 TABLET ORAL at 21:08

## 2024-12-11 RX ADMIN — POLYETHYLENE GLYCOL 3350 17 G: 17 POWDER, FOR SOLUTION ORAL at 09:58

## 2024-12-11 RX ADMIN — Medication 250 MG: at 21:06

## 2024-12-11 RX ADMIN — FAMOTIDINE 20 MG: 20 TABLET, FILM COATED ORAL at 21:07

## 2024-12-11 RX ADMIN — BARIUM SULFATE 15 ML: 400 PASTE ORAL at 14:35

## 2024-12-11 RX ADMIN — ACETAMINOPHEN 650 MG: 325 TABLET ORAL at 21:06

## 2024-12-11 RX ADMIN — FAMOTIDINE 20 MG: 20 TABLET, FILM COATED ORAL at 09:57

## 2024-12-11 RX ADMIN — Medication 250 MG: at 09:57

## 2024-12-11 ASSESSMENT — PAIN DESCRIPTION - DESCRIPTORS: DESCRIPTORS: ACHING;SORE

## 2024-12-11 ASSESSMENT — PAIN DESCRIPTION - LOCATION: LOCATION: GENERALIZED

## 2024-12-11 ASSESSMENT — PAIN SCALES - GENERAL: PAINLEVEL_OUTOF10: 3

## 2024-12-11 NOTE — CARE COORDINATION
Pt transfer from [facility] for thrombectomy.Transferred from [facility] yesterday. Continues with Corpak- mbss ordered today. Asked therapy to update today.cathyo PT 7 and  OT 7 Spoke with wife Lisa and patient in room.- requesting  [facility].and prior sw made referral and ambulance form in soft chart. Spoke with Alessandro today.- he is following and await the above.to see if appropriate.2 nd choice [facility] Electronically signed by Marilu Yao RN on 12/11/2024 at 9:51 AM

## 2024-12-11 NOTE — PROGRESS NOTES
Hospitalist Progress Note      Chief Complaint:  had concerns including Cerebrovascular Accident (Pt transfer from [facility] for thrombectomy . ).    Admission Date: 2024     SYNOPSIS:Pelon Jarvis has a past medical history that includes CKD, carotid artery stenosis presented to [facility] with stroke like symptoms.  He was having left-sided weakness and difficulty with speech.  He initially had NIH of 23. He was found to have R M1 occlusion.  He was transferred ER to ER due to the patient being a candidate for EVT for LVO.   He was found to have 100% occlusion of the right ICA.  Tandem thrombus in the right MCA M1 100% occlusion intracranial status post thrombectomy.  He had right ICA triple stent assisted angioplasty and recanalization.  He was admitted to ICU.      : Hypotensive, received IV fluids, To keep MAP >50     : MRI showing small area of hemorrhage, Neurosurgery consulted ASA, katrin held until seen by neurosurgery, Repeat CT head   BP improved, Cr: 1.6     : Cr improved to 1.3, CT head negative for Hemorrhage, Continue DAPT    SUBJECTIVE:    Patient seen and examined at bedside, has weakness on the left side, Records reviewed.   Stable overnight. No overnight issues reported.  He states he has been calling his wife and daughter earlier today with no response.  his wife walked in during the interview, she states he is more alert today.  She is hopeful that he would qualify to be discharged to the new rehab facility.     Temp (24hrs), Av.6 °F (37 °C), Min:98.1 °F (36.7 °C), Max:99.2 °F (37.3 °C)    DIET: Diet NPO  ADULT TUBE FEEDING; Nasoenteric; Standard with Fiber; Continuous; 10; Yes; 10; Q 8 hours; 45; 300; Q 4 hours; Protein; 1 Dose; Daily  CODE: Limited    Intake/Output Summary (Last 24 hours) at 2024 0996  Last data filed at 2024 0556  Gross per 24 hour   Intake 1353.27 ml   Output 1400 ml   Net -46.73 ml       OBJECTIVE:    BP (!) 111/54

## 2024-12-11 NOTE — PROGRESS NOTES
SPEECH/LANGUAGE PATHOLOGY  VIDEOFLUOROSCOPIC STUDY OF SWALLOWING (MBS)   and PLAN OF CARE    PATIENT NAME:  Pelon Jarvis  (male)     MRN:  [id number]    :  1950  (74 y.o.)  STATUS:  Inpatient: Room 8509/8509-A    TODAY'S DATE:  2024  ORDER DATE, DESCRIPTION AND REFERRING PROVIDER:    24  SLP video swallow  Start:  24,   End:  24,   ONE TIME,   Standing Count:  1 Occurrences,   R         Deana Poole MD     REASON FOR REFERRAL: dysphagia   EVALUATING THERAPIST: DEMETRA Waite      RESULTS:      DYSPHAGIA DIAGNOSIS:  Clinical indicators of moderate oropharyngeal phase dysphagia     DIET RECOMMENDATIONS:  Pureed consistency solids (IDDSI level 4) with  honey consistency (moderately thick - IDDSI level 3)  liquids    FEEDING RECOMMENDATIONS:    Assistance level:  Full assistance is needed during all oral intake     Compensatory strategies recommended: Thorough oral care to prevent colonization of oral bacteria      Discussed recommendations with:   floor nurse (patient nurse and charge nurse unavailable)    SPEECH THERAPY  PLAN OF CARE   The dysphagia POC is established based on physician order and dysphagia diagnosis    Skilled SLP intervention for dysphagia management on acute care up to 5 x per week until goals met, pt plateaus in function and/or discharged from hospital      Conditions Requiring Skilled Therapeutic Intervention for dysphagia:    Patient is performing below functional baseline d/t  current acute condition, Multiple diagnoses, multiple medications, and increased dependency upon caregivers.    SPECIFIC DYSPHAGIA INTERVENTIONS TO INCLUDE:     ongoing mealtime assessment to provide diet modification and compensatory strategy implementation to minimize risk of aspiration associated with PO intake  PO trials of upgraded diet textures with SLP only to determine the least restrictive PO diet     Specific instructions for next treatment:  ongoing PO analysis

## 2024-12-11 NOTE — PROGRESS NOTES
Physical Therapy  Physical Therapy Treatment    Name: Pelon Jarvis  : 1950  MRN: [id number]      Date of Service: 2024    Evaluating PT:  Skinny Aparicio PT, DPT [id number]    Room #:  8509/8509-A  Diagnosis:  Acute CVA (cerebrovascular accident) ([facility]) [I63.9]  PMHx/PSHx:   has a past medical history of Arthritis, Celiac disease, Hypertension, and Peripheral neuropathy.   has a past surgical history that includes Tonsillectomy; Nose surgery; Appendectomy; and IR MECHANICAL ART THROMBECTOMY INTRACRANIAL (2024).  Procedure/Surgery:   Right MCA , Right ICA stenting   Precautions:  Falls, L hemiparesis, L tone, corpak, external catheter, aphasia, latent, alarms, SBP < 140  Equipment Needs:  TBD    SUBJECTIVE:    Pt lives alone in a 2 story home with 2 step(s) to enter, per chart.  Pt ambulated without device and was independent PTA.    OBJECTIVE:   Initial Evaluation  Date: 24 Treatment  2024 Short Term/ Long Term   Goals   AM-PAC 6 Clicks     Was pt agreeable to Eval/treatment? Yes Yes    Does pt have pain? No c/o pain \"Everything\"    Bed Mobility  Rolling: MaxA  Supine to sit: MaxA x 2 with HOB elevated  Sit to supine: MaxA x 2  Scooting: MaxA Rolling: NT  Supine to sit: MaxA x2  Sit to supine: MaxA x2  Scooting: NT Cristina   Transfers Sit to stand: NT  Stand to sit: NT  Stand pivot: NT Sit to stand: MaxA x2  Stand to sit: MaxA x2  Stand pivot: NT Cristina with AAD   Ambulation   NT NT >15 feet with Cristina with AAD   Stair negotiation: ascended and descended NT NT    ROM BUE:  Defer to OT note  BLE:  WFL     Strength BUE:  Defer to OT note  RLE:  4/5  LLE:  0/5  Increase by 1/3 MMT grade   Balance Sitting EOB:  ModA to MaxA  Dynamic Standing:  NT Sitting EOB:  Cristina <> MaxA  Dynamic Standing:  NT Sitting EOB:  Independent  Dynamic Standing:  Cristina with AAD     Pt is A & O x 3 (self, place, time)  Sensation:  No reports of numbness/tingling to extremities  Edema:  Unremarkable    Therapeutic  Exercises/Activities:  - L knee flexion/extension PROM (1 x 10)  - L ankle plantarflexion/dorsiflexion PROM (1 x 10)    Patient education  Pt educated on safety during functional mobility.    Patient response to education:   Pt expressed understanding Pt demonstrated skill Pt requires further education in this area   Yes Yes Yes     ASSESSMENT:    Comments:  Session cleared by nurse. Patient in bed upon arrival; agreeable to PT session with OT collaboration. Easily distracted throughout session. Required increased time, assistance of trunk and BLE to perform bed mobility. Multidirectional balance loss while sitting. Completed two sit <> stand transfers; required L knee block. Flexed forward standing posture. Static standing tolerated for short duration. Patient left in bed with call light in reach.    Treatment:  Patient practiced and was instructed in the following treatment:    Therapeutic exercises/activities - performed to maintain/improve joint mobility  Bed mobility - verbal cues to facilitate proper positioning and sequencing; physical assistance provided as needed during activity  Static sitting/standing - performed to promote activity tolerance, postural awareness, balance maintenance  Functional transfers - physical assistance provided as needed during activity  Skilled positioning - patient positioned optimally to protect skin/joint integrity and promote comfort    PLAN:    Patient is making good progress towards established goals.  Will continue with current POC.      Time in  1025  Time out  1105    Total Treatment Time  40 minutes     CPT codes:  [] Gait training 82034 0 minutes  [] Manual therapy 07531 0 minutes  [x] Therapeutic activities 59231 40 minutes  [] Therapeutic exercises 85262 0 minutes  [] Neuromuscular reeducation 95886 0 minutes    Germán Quintero, PT, DPT  [id number]

## 2024-12-11 NOTE — PROGRESS NOTES
Occupational Therapy  OT BEDSIDE TREATMENT NOTE   [facility]  [address] [place], [place]       Date:2024  Patient Name: Pelon Jarvis  MRN: [id number]  : 1950  Room: [address] [place] [address]-A     Per OT Eval:    Evaluating OT: Ana Johnson, OTR/L [id number]     Referring Provider:   Authorizing   Tate Tran MD       Specific Provider Orders/Date: OT eval and treat (24)        Diagnosis: Acute CVA (cerebrovascular accident) ([facility]) [I63.9]            Reason for admission:   Presented to outside hospital with confusion and left side weakness that began the night prior. He was found to have a right MCA CVA. He was transferred to [facility] for potential IR intervention. In IR he had thrombectomy of the MCA and triple stent placed in right ICA. He was started on an integrelin gtt and admitted to [facility].     Surgery/Procedures:  Right MCA , Right ICA stenting       Pertinent Medical History:    Past Medical History        Past Medical History:   Diagnosis Date    Arthritis 2024    Celiac disease 2024    Hypertension 2024    Peripheral neuropathy 2024         *Precautions:  Fall Risk, alarms, L hemiparesis, L tone, aphasia, latent responses, SBP<140, NPO/corpak     Assessment of current deficits   [x] Functional mobility          [x]ADLs           [x] Strength                  []Cognition   [x] Functional transfers        [x] IADLs         [x] Safety Awareness   [x]Endurance   [x] Fine Coordination           [x] ROM           [] Vision/perception    []Sensation     [x]Gross Motor Coordination [x] Balance    [] Delirium                  [x]Motor Control  [] Communication     OT PLAN OF CARE   OT POC based on physician orders, patient diagnosis and results of clinical assessment.        Frequency/Duration: 1-3 days/wk for 1-2 weeks PRN    Specific OT Treatment to include:   ADL retraining/adapted techniques and AE recommendations  transfers, improving midline orientation, upright posture with sitting/standing tasks & ADL retraining with rafi-dressing techniques, increasing awareness of L side of body to ease tasks, improve safety & prevent falls to return home safely.  Wife present during session and educated throughout. Educated pt on importance of positioning of L UE for joint protection & edema control, along with SROM. Completed PROM L UE in all planes with gentle stretching with good tolerance and decreases tone.     Comments: Upon arrival pt was in bed & agreeable for therapy, nsg approved, wife present. At end of session pt was returned to bed, HOB upright, heels off loaded, B UE supported, noris L UE, all lines and tubes intact, call light within reach. Bed alarm set.    Pt has made slow progress towards set goals.   Continue with current plan of care.    Treatment Time In:1025            Treatment Time Out: 1105           Treatment Charges: Mins Units   Ther Ex  29916     Manual Therapy 22071     Thera Activities 54243 15 1   ADL/Home Mgt 44695 10 1   Neuro Re-ed 71393 15 1   Group Therapy      Orthotic manage/training  69102     Non-Billable Time     Total Timed Treatment 40 3       ANIBAL Springer/MARTELL [id number]

## 2024-12-12 LAB
ALBUMIN SERPL-MCNC: 2.9 G/DL (ref 3.5–5.2)
ALP SERPL-CCNC: 67 U/L (ref 40–129)
ALT SERPL-CCNC: 31 U/L (ref 0–40)
ANION GAP SERPL CALCULATED.3IONS-SCNC: 8 MMOL/L (ref 7–16)
AST SERPL-CCNC: 33 U/L (ref 0–39)
BILIRUB SERPL-MCNC: 1.1 MG/DL (ref 0–1.2)
BUN SERPL-MCNC: 22 MG/DL (ref 6–23)
CALCIUM SERPL-MCNC: 8.6 MG/DL (ref 8.6–10.2)
CHLORIDE SERPL-SCNC: 107 MMOL/L (ref 98–107)
CO2 SERPL-SCNC: 25 MMOL/L (ref 22–29)
CREAT SERPL-MCNC: 1.6 MG/DL (ref 0.7–1.2)
ERYTHROCYTE [DISTWIDTH] IN BLOOD BY AUTOMATED COUNT: 12.3 % (ref 11.5–15)
GFR, ESTIMATED: 46 ML/MIN/1.73M2
GLUCOSE SERPL-MCNC: 109 MG/DL (ref 74–99)
HCT VFR BLD AUTO: 30.3 % (ref 37–54)
HGB BLD-MCNC: 9.9 G/DL (ref 12.5–16.5)
MAGNESIUM SERPL-MCNC: 2.3 MG/DL (ref 1.6–2.6)
MCH RBC QN AUTO: 33.7 PG (ref 26–35)
MCHC RBC AUTO-ENTMCNC: 32.7 G/DL (ref 32–34.5)
MCV RBC AUTO: 103.1 FL (ref 80–99.9)
PHOSPHATE SERPL-MCNC: 3.3 MG/DL (ref 2.5–4.5)
PLATELET # BLD AUTO: 213 K/UL (ref 130–450)
PMV BLD AUTO: 11 FL (ref 7–12)
POTASSIUM SERPL-SCNC: 4.7 MMOL/L (ref 3.5–5)
PROT SERPL-MCNC: 5.7 G/DL (ref 6.4–8.3)
RBC # BLD AUTO: 2.94 M/UL (ref 3.8–5.8)
SODIUM SERPL-SCNC: 140 MMOL/L (ref 132–146)
WBC OTHER # BLD: 10.8 K/UL (ref 4.5–11.5)

## 2024-12-12 PROCEDURE — 6370000000 HC RX 637 (ALT 250 FOR IP): Performed by: NURSE PRACTITIONER

## 2024-12-12 PROCEDURE — 6370000000 HC RX 637 (ALT 250 FOR IP): Performed by: CLINICAL NURSE SPECIALIST

## 2024-12-12 PROCEDURE — 80053 COMPREHEN METABOLIC PANEL: CPT

## 2024-12-12 PROCEDURE — 6370000000 HC RX 637 (ALT 250 FOR IP): Performed by: INTERNAL MEDICINE

## 2024-12-12 PROCEDURE — 84100 ASSAY OF PHOSPHORUS: CPT

## 2024-12-12 PROCEDURE — 92526 ORAL FUNCTION THERAPY: CPT

## 2024-12-12 PROCEDURE — 2060000000 HC ICU INTERMEDIATE R&B

## 2024-12-12 PROCEDURE — 97129 THER IVNTJ 1ST 15 MIN: CPT

## 2024-12-12 PROCEDURE — 6360000002 HC RX W HCPCS: Performed by: STUDENT IN AN ORGANIZED HEALTH CARE EDUCATION/TRAINING PROGRAM

## 2024-12-12 PROCEDURE — 99232 SBSQ HOSP IP/OBS MODERATE 35: CPT | Performed by: INTERNAL MEDICINE

## 2024-12-12 PROCEDURE — 83735 ASSAY OF MAGNESIUM: CPT

## 2024-12-12 PROCEDURE — 85027 COMPLETE CBC AUTOMATED: CPT

## 2024-12-12 PROCEDURE — 36415 COLL VENOUS BLD VENIPUNCTURE: CPT

## 2024-12-12 RX ORDER — FAMOTIDINE 40 MG/5ML
20 POWDER, FOR SUSPENSION ORAL DAILY
Status: DISCONTINUED | OUTPATIENT
Start: 2024-12-12 | End: 2024-12-14 | Stop reason: HOSPADM

## 2024-12-12 RX ORDER — LABETALOL HYDROCHLORIDE 5 MG/ML
5 INJECTION, SOLUTION INTRAVENOUS EVERY 6 HOURS PRN
Status: DISCONTINUED | OUTPATIENT
Start: 2024-12-12 | End: 2024-12-14 | Stop reason: HOSPADM

## 2024-12-12 RX ORDER — ACETAMINOPHEN 650 MG/1
650 SUPPOSITORY RECTAL EVERY 4 HOURS PRN
Status: DISCONTINUED | OUTPATIENT
Start: 2024-12-12 | End: 2024-12-14 | Stop reason: HOSPADM

## 2024-12-12 RX ORDER — HYDRALAZINE HYDROCHLORIDE 20 MG/ML
10 INJECTION INTRAMUSCULAR; INTRAVENOUS EVERY 6 HOURS PRN
Status: DISCONTINUED | OUTPATIENT
Start: 2024-12-12 | End: 2024-12-14 | Stop reason: HOSPADM

## 2024-12-12 RX ORDER — ACETAMINOPHEN 160 MG/5ML
650 LIQUID ORAL EVERY 4 HOURS PRN
Status: DISCONTINUED | OUTPATIENT
Start: 2024-12-12 | End: 2024-12-14 | Stop reason: HOSPADM

## 2024-12-12 RX ADMIN — ASPIRIN 81 MG CHEWABLE TABLET 81 MG: 81 TABLET CHEWABLE at 09:45

## 2024-12-12 RX ADMIN — FAMOTIDINE 20 MG: 40 POWDER, FOR SUSPENSION ORAL at 09:46

## 2024-12-12 RX ADMIN — MODAFINIL 50 MG: 100 TABLET ORAL at 10:33

## 2024-12-12 RX ADMIN — TICAGRELOR 90 MG: 90 TABLET ORAL at 09:46

## 2024-12-12 RX ADMIN — Medication 250 MG: at 09:46

## 2024-12-12 RX ADMIN — Medication 250 MG: at 16:50

## 2024-12-12 RX ADMIN — ATORVASTATIN CALCIUM 40 MG: 40 TABLET, FILM COATED ORAL at 21:13

## 2024-12-12 RX ADMIN — Medication 250 MG: at 21:13

## 2024-12-12 RX ADMIN — ENOXAPARIN SODIUM 40 MG: 100 INJECTION SUBCUTANEOUS at 09:46

## 2024-12-12 RX ADMIN — TICAGRELOR 90 MG: 90 TABLET ORAL at 21:13

## 2024-12-12 RX ADMIN — Medication 250 MG: at 14:38

## 2024-12-12 ASSESSMENT — PAIN SCALES - GENERAL
PAINLEVEL_OUTOF10: 0

## 2024-12-12 NOTE — PROGRESS NOTES
SPEECH LANGUAGE PATHOLOGY  DAILY PROGRESS NOTE        PATIENT NAME:  Pelon Jarvis      ROOM:  8509/8509-A :  1950         TODAY'S DATE:  2024       Patient seen for cognition and dysphagia      Current Diet Order: ADULT TUBE FEEDING; Nasoenteric; Standard with Fiber; Continuous; 10; Yes; 10; Q 8 hours; 45; 300; Q 4 hours; Protein; 1 Dose; Daily  ADULT DIET; Dysphagia - Pureed; Moderately Thick (Honey)  Results and recommendations of swallowing evaluation reviewed.  Pt monitored during mealtime.  Tolerating current diet without noted or reported difficulties.  BOTR strength/ ROM exercises to reduce pharyngeal residuals and improve epiglottic inversion were completed with moderate verbal prompts  Laryngeal strength/ ROM therapeutic exercises to improve airway protection for the least restrictive PO diet  were completed with moderate verbal prompts        Will continue

## 2024-12-12 NOTE — PROGRESS NOTES
Hospitalist Progress Note      Chief Complaint:  had concerns including Cerebrovascular Accident (Pt transfer from [facility] for thrombectomy . ).    Admission Date: 2024     SYNOPSIS:Pelon Jarvis has a past medical history that includes CKD, carotid artery stenosis presented to [facility] with stroke like symptoms.  He was having left-sided weakness and difficulty with speech.  He initially had NIH of 23. He was found to have R M1 occlusion.  He was transferred ER to ER due to the patient being a candidate for EVT for LVO.   He was found to have 100% occlusion of the right ICA.  Tandem thrombus in the right MCA M1 100% occlusion intracranial status post thrombectomy.  He had right ICA triple stent assisted angioplasty and recanalization.  He was admitted to ICU.      : Hypotensive, received IV fluids, To keep MAP >50     : MRI showing small area of hemorrhage, Neurosurgery consulted ASA, katrin held until seen by neurosurgery, Repeat CT head   BP improved, Cr: 1.6     : Cr improved to 1.3, CT head negative for Hemorrhage, Continue DAPT    SUBJECTIVE:    Passed swallow eval yesterday.  Tolerating diet.  Wife at bedside.    Temp (24hrs), Av.5 °F (36.9 °C), Min:98 °F (36.7 °C), Max:99.2 °F (37.3 °C)    DIET: ADULT TUBE FEEDING; Nasoenteric; Standard with Fiber; Continuous; 10; Yes; 10; Q 8 hours; 45; 300; Q 4 hours; Protein; 1 Dose; Daily  ADULT DIET; Dysphagia - Pureed; Moderately Thick (Honey)  CODE: Limited    Intake/Output Summary (Last 24 hours) at 2024 0908  Last data filed at 2024 0350  Gross per 24 hour   Intake 60 ml   Output 3150 ml   Net -3090 ml       OBJECTIVE:    BP (!) 109/58   Pulse 73   Temp 99.2 °F (37.3 °C) (Oral)   Resp 18   Ht 1.753 m (5' 9.02\")   Wt 78.6 kg (173 lb 4.5 oz)   SpO2 93%   BMI 25.58 kg/m²     General appearance: No apparent distress, appears stated age and cooperative., Corpak in place.   HEENT:  Normocephalic.  Neck:

## 2024-12-12 NOTE — PROGRESS NOTES
SUBJECTIVE:Spiritual Health History and Assessment/Progress Note  [facility]    Spiritual/Emotional Needs, Follow-up,  ,  ,      Name: Pelon Jarvis MRN: [id number]    Age: 74 y.o.     Sex: male   Language: English   Anabaptist: Islam   Cerebrovascular accident (CVA) ([facility])     Date: 12/12/2024                           Spiritual Assessment continued in [facility] IMCU/NEURO        Referral/Consult From: Rounding   Encounter Overview/Reason: Spiritual/Emotional Needs, Follow-up  Service Provided For: Patient, Significant other    Juana, Belief, Meaning:   Patient identifies as spiritual, is connected with a juana tradition or spiritual practice, and has beliefs or practices that help with coping during difficult times  Family/Friends identify as spiritual, are connected with a juana tradition or spiritual practice, and have beliefs or practices that help with coping during difficult times      Importance and Influence:  Patient has spiritual/personal beliefs that influence decisions regarding their health  Family/Friends have spiritual/personal beliefs that influence decisions regarding the patient's health    Community:  Patient is connected with a spiritual community  Family/Friends are connected with a spiritual community:    Assessment and Plan of Care:     Patient Interventions include: Provided sacramental/Worship ritual  Family/Friends Interventions include: Provided sacramental/Worship ritual    Patient Plan of Care: No spiritual needs identified for follow-up and Spiritual Care available upon further referral  Family/Friends Plan of Care: No spiritual needs identified for follow-up and Spiritual Care available upon further referral    Electronically signed by Chaplain Emily on 12/12/2024 at 2:15 PM

## 2024-12-12 NOTE — CARE COORDINATION
Had MBSS completed yesterday- Pureed consistency solids  with  honey consistency  liquids  Spoke with wife Lisa and patient in room.yesterday- requesting  [facility].and prior sw made referral and ambulance form in soft chart. Spoke with Alessandro yesterday.- he is following will see if appropriate.[facility] nd choice [facility].Electronically signed by Marilu Yao RN on 12/12/2024 at 7:40 AM    Messaged Al[facility]andro to see if able to accept to [facility] - awaiting Dr Carlton review.Electronically signed by Marilu Yao RN on 12/12/2024 at 9:17 AM    Asked for updates PT /OT tomorrow. Per Alessandro - Dr Carlton recommending snf unless makes significant improvement. Will follow up tomorrow. Referral to [facility]  Electronically signed by Marilu Yao RN on 12/12/2024 at 10:30 AM    Spoke with patient and wife- will get me his ss#. Referral to [facility] [place]. Await determination.Electronically signed by Marilu Yao RN on 12/12/2024 at 10:52 AM    [facility] accept if wants to go there - per [facility]  Electronically signed by Marilu Yao RN on 12/12/2024 at 12:47 PM

## 2024-12-12 NOTE — PLAN OF CARE
Problem: Chronic Conditions and Co-morbidities  Goal: Patient's chronic conditions and co-morbidity symptoms are monitored and maintained or improved  12/12/2024 0610 by Daisy Victoria RN  Outcome: Progressing  Flowsheets (Taken 12/11/2024 2000)  Care Plan - Patient's Chronic Conditions and Co-Morbidity Symptoms are Monitored and Maintained or Improved: Monitor and assess patient's chronic conditions and comorbid symptoms for stability, deterioration, or improvement  12/11/2024 1842 by Aba Guevara RN  Outcome: Progressing     Problem: Discharge Planning  Goal: Discharge to home or other facility with appropriate resources  12/12/2024 0610 by Daisy Victoria RN  Outcome: Progressing  Flowsheets (Taken 12/11/2024 2000)  Discharge to home or other facility with appropriate resources: Identify barriers to discharge with patient and caregiver  12/11/2024 1842 by Aba Guevara RN  Outcome: Progressing     Problem: Safety - Adult  Goal: Free from fall injury  12/12/2024 0610 by Daisy Victoria RN  Outcome: Progressing  12/11/2024 1842 by Aba Guevara RN  Outcome: Progressing     Problem: Skin/Tissue Integrity  Goal: Absence of new skin breakdown  Description: 1.  Monitor for areas of redness and/or skin breakdown  2.  Assess vascular access sites hourly  3.  Every 4-6 hours minimum:  Change oxygen saturation probe site  4.  Every 4-6 hours:  If on nasal continuous positive airway pressure, respiratory therapy assess nares and determine need for appliance change or resting period.  12/12/2024 0610 by Daisy Victoria RN  Outcome: Progressing  12/11/2024 1842 by Aba Guevara RN  Outcome: Progressing     Problem: ABCDS Injury Assessment  Goal: Absence of physical injury  12/12/2024 0610 by Daisy Victoria RN  Outcome: Progressing  Flowsheets (Taken 12/11/2024 2000)  Absence of Physical Injury: Implement safety measures based on patient assessment  12/11/2024 1842 by Aba Guevara RN  Outcome:  Monitor cardiac rate and rhythm  12/11/2024 1842 by Aba Guevara RN  Outcome: Progressing     Problem: Nutrition Deficit:  Goal: Optimize nutritional status  12/12/2024 0610 by Daisy Victoria RN  Outcome: Progressing  12/11/2024 1842 by Aba Guevara RN  Outcome: Progressing     Problem: Pain  Goal: Verbalizes/displays adequate comfort level or baseline comfort level  12/12/2024 0610 by Daisy Victoria, RN  Outcome: Progressing  12/11/2024 1842 by Aba Guevara RN  Outcome: Progressing

## 2024-12-12 NOTE — ACP (ADVANCE CARE PLANNING)
Advance Care Planning   Healthcare Decision Maker:    Primary Decision Maker: Giovanny Flahertyil - Spouse - [phone]    Secondary Decision Maker: Sangita Billings - Child - [phone]    Click here to complete Healthcare Decision Makers including selection of the Healthcare Decision Maker Relationship (ie \"Primary\").

## 2024-12-12 NOTE — PLAN OF CARE
Problem: Chronic Conditions and Co-morbidities  Goal: Patient's chronic conditions and co-morbidity symptoms are monitored and maintained or improved  12/12/2024 0653 by Aurora Lott RN  Outcome: Progressing  12/12/2024 0610 by Daisy Victoria RN  Outcome: Progressing  Flowsheets (Taken 12/11/2024 2000)  Care Plan - Patient's Chronic Conditions and Co-Morbidity Symptoms are Monitored and Maintained or Improved: Monitor and assess patient's chronic conditions and comorbid symptoms for stability, deterioration, or improvement  12/11/2024 1842 by Aba Guevara RN  Outcome: Progressing     Problem: Discharge Planning  Goal: Discharge to home or other facility with appropriate resources  12/12/2024 0653 by Aurora Lott RN  Outcome: Progressing  12/12/2024 0610 by Daisy Victoria RN  Outcome: Progressing  Flowsheets (Taken 12/11/2024 2000)  Discharge to home or other facility with appropriate resources: Identify barriers to discharge with patient and caregiver  12/11/2024 1842 by Aba Guevara RN  Outcome: Progressing     Problem: Safety - Adult  Goal: Free from fall injury  12/12/2024 0653 by Aurora Lott RN  Outcome: Progressing  12/12/2024 0610 by Daisy Victoria RN  Outcome: Progressing  12/11/2024 1842 by Aba Guevara RN  Outcome: Progressing     Problem: Neurosensory - Adult  Goal: Achieves stable or improved neurological status  12/12/2024 0653 by Aurora Lott RN  Outcome: Progressing  12/12/2024 0610 by Daisy Victoria RN  Outcome: Progressing  Flowsheets (Taken 12/11/2024 2000)  Achieves stable or improved neurological status: Assess for and report changes in neurological status  12/11/2024 1842 by Aba Guevara RN  Outcome: Progressing  Goal: Absence of seizures  12/12/2024 0653 by Aurora Lott RN  Outcome: Progressing  12/12/2024 0610 by Daisy Victoria RN  Outcome: Progressing  Flowsheets (Taken 12/11/2024 2000)  Absence of seizures: Monitor for seizure activity.

## 2024-12-13 VITALS
OXYGEN SATURATION: 96 % | WEIGHT: 173.28 LBS | RESPIRATION RATE: 20 BRPM | HEART RATE: 76 BPM | BODY MASS INDEX: 25.67 KG/M2 | HEIGHT: 69 IN | DIASTOLIC BLOOD PRESSURE: 54 MMHG | SYSTOLIC BLOOD PRESSURE: 117 MMHG | TEMPERATURE: 99.1 F

## 2024-12-13 LAB
ALBUMIN SERPL-MCNC: 2.8 G/DL (ref 3.5–5.2)
ALP SERPL-CCNC: 82 U/L (ref 40–129)
ALT SERPL-CCNC: 61 U/L (ref 0–40)
ANION GAP SERPL CALCULATED.3IONS-SCNC: 9 MMOL/L (ref 7–16)
AST SERPL-CCNC: 67 U/L (ref 0–39)
BILIRUB SERPL-MCNC: 0.9 MG/DL (ref 0–1.2)
BUN SERPL-MCNC: 23 MG/DL (ref 6–23)
CALCIUM SERPL-MCNC: 8.3 MG/DL (ref 8.6–10.2)
CHLORIDE SERPL-SCNC: 105 MMOL/L (ref 98–107)
CO2 SERPL-SCNC: 23 MMOL/L (ref 22–29)
CREAT SERPL-MCNC: 1.7 MG/DL (ref 0.7–1.2)
ERYTHROCYTE [DISTWIDTH] IN BLOOD BY AUTOMATED COUNT: 12.1 % (ref 11.5–15)
GFR, ESTIMATED: 42 ML/MIN/1.73M2
GLUCOSE SERPL-MCNC: 109 MG/DL (ref 74–99)
HCT VFR BLD AUTO: 28.8 % (ref 37–54)
HGB BLD-MCNC: 9.5 G/DL (ref 12.5–16.5)
MAGNESIUM SERPL-MCNC: 2.4 MG/DL (ref 1.6–2.6)
MCH RBC QN AUTO: 33.6 PG (ref 26–35)
MCHC RBC AUTO-ENTMCNC: 33 G/DL (ref 32–34.5)
MCV RBC AUTO: 101.8 FL (ref 80–99.9)
PHOSPHATE SERPL-MCNC: 4.2 MG/DL (ref 2.5–4.5)
PLATELET # BLD AUTO: 234 K/UL (ref 130–450)
PMV BLD AUTO: 10.4 FL (ref 7–12)
POTASSIUM SERPL-SCNC: 4.3 MMOL/L (ref 3.5–5)
PROT SERPL-MCNC: 5.5 G/DL (ref 6.4–8.3)
RBC # BLD AUTO: 2.83 M/UL (ref 3.8–5.8)
SODIUM SERPL-SCNC: 137 MMOL/L (ref 132–146)
WBC OTHER # BLD: 10.3 K/UL (ref 4.5–11.5)

## 2024-12-13 PROCEDURE — 80053 COMPREHEN METABOLIC PANEL: CPT

## 2024-12-13 PROCEDURE — 97530 THERAPEUTIC ACTIVITIES: CPT

## 2024-12-13 PROCEDURE — 84100 ASSAY OF PHOSPHORUS: CPT

## 2024-12-13 PROCEDURE — 6370000000 HC RX 637 (ALT 250 FOR IP): Performed by: CLINICAL NURSE SPECIALIST

## 2024-12-13 PROCEDURE — 97535 SELF CARE MNGMENT TRAINING: CPT

## 2024-12-13 PROCEDURE — 6370000000 HC RX 637 (ALT 250 FOR IP): Performed by: NURSE PRACTITIONER

## 2024-12-13 PROCEDURE — 99239 HOSP IP/OBS DSCHRG MGMT >30: CPT | Performed by: INTERNAL MEDICINE

## 2024-12-13 PROCEDURE — 85027 COMPLETE CBC AUTOMATED: CPT

## 2024-12-13 PROCEDURE — 97110 THERAPEUTIC EXERCISES: CPT

## 2024-12-13 PROCEDURE — 6360000002 HC RX W HCPCS: Performed by: STUDENT IN AN ORGANIZED HEALTH CARE EDUCATION/TRAINING PROGRAM

## 2024-12-13 PROCEDURE — 83735 ASSAY OF MAGNESIUM: CPT

## 2024-12-13 PROCEDURE — 97129 THER IVNTJ 1ST 15 MIN: CPT

## 2024-12-13 PROCEDURE — 92526 ORAL FUNCTION THERAPY: CPT

## 2024-12-13 PROCEDURE — 6370000000 HC RX 637 (ALT 250 FOR IP): Performed by: INTERNAL MEDICINE

## 2024-12-13 PROCEDURE — 36415 COLL VENOUS BLD VENIPUNCTURE: CPT

## 2024-12-13 PROCEDURE — 97112 NEUROMUSCULAR REEDUCATION: CPT

## 2024-12-13 RX ORDER — ASPIRIN 81 MG/1
81 TABLET, CHEWABLE ORAL DAILY
DISCHARGE
Start: 2024-12-13

## 2024-12-13 RX ORDER — ATORVASTATIN CALCIUM 40 MG/1
40 TABLET, FILM COATED ORAL NIGHTLY
DISCHARGE
Start: 2024-12-13

## 2024-12-13 RX ORDER — FAMOTIDINE 40 MG/5ML
20 POWDER, FOR SUSPENSION ORAL DAILY
DISCHARGE
Start: 2024-12-13

## 2024-12-13 RX ADMIN — ATORVASTATIN CALCIUM 40 MG: 40 TABLET, FILM COATED ORAL at 20:55

## 2024-12-13 RX ADMIN — Medication 250 MG: at 20:54

## 2024-12-13 RX ADMIN — Medication 250 MG: at 17:19

## 2024-12-13 RX ADMIN — ENOXAPARIN SODIUM 40 MG: 100 INJECTION SUBCUTANEOUS at 10:04

## 2024-12-13 RX ADMIN — ASPIRIN 81 MG CHEWABLE TABLET 81 MG: 81 TABLET CHEWABLE at 10:03

## 2024-12-13 RX ADMIN — TICAGRELOR 90 MG: 90 TABLET ORAL at 20:55

## 2024-12-13 RX ADMIN — POLYETHYLENE GLYCOL 3350 17 G: 17 POWDER, FOR SOLUTION ORAL at 10:04

## 2024-12-13 RX ADMIN — Medication 250 MG: at 14:16

## 2024-12-13 RX ADMIN — TICAGRELOR 90 MG: 90 TABLET ORAL at 10:04

## 2024-12-13 RX ADMIN — ACETAMINOPHEN 650 MG: 650 SOLUTION ORAL at 02:16

## 2024-12-13 RX ADMIN — MODAFINIL 50 MG: 100 TABLET ORAL at 10:04

## 2024-12-13 RX ADMIN — ACETAMINOPHEN 650 MG: 650 SOLUTION ORAL at 20:54

## 2024-12-13 RX ADMIN — Medication 250 MG: at 10:04

## 2024-12-13 ASSESSMENT — PAIN DESCRIPTION - DESCRIPTORS
DESCRIPTORS: ACHING;TENDER;SORE;NAGGING
DESCRIPTORS: ACHING;SORE

## 2024-12-13 ASSESSMENT — PAIN DESCRIPTION - LOCATION
LOCATION: GENERALIZED
LOCATION: GENERALIZED

## 2024-12-13 ASSESSMENT — PAIN SCALES - GENERAL
PAINLEVEL_OUTOF10: 4
PAINLEVEL_OUTOF10: 4
PAINLEVEL_OUTOF10: 0

## 2024-12-13 NOTE — CARE COORDINATION
Per therapy - not appropraite for ARU. Spoke with wife and patient - need ss# patient will not give it up.Called pcp ofice do not have.Wife going to go home to get SS#. Wife upset not appropriate for [facility]  Asked for referral to [facility] -(closer to home) spoke with Clarissa And referral given Await determination Electronically signed by Marilu Yao RN on 12/13/2024 at 10:46 AM    Spoke with [facility] [place] can accept. Ntfd Clarissa with [facility] Milana mckinneyatalandy and ss # sent to [facility] and given to [facility]. [facility] no availability. [facility] - asked for next avaiilbility set up for 5 pm today. Patient, wife Lisa, bedside RN, and facility liaison all ntfd of transport time.Electronically signed by Marilu Yao RN on 12/13/2024 at 1:45 PM

## 2024-12-13 NOTE — PLAN OF CARE
Problem: Chronic Conditions and Co-morbidities  Goal: Patient's chronic conditions and co-morbidity symptoms are monitored and maintained or improved  12/13/2024 1101 by Anna Brandt RN  Outcome: Progressing  12/13/2024 0422 by Daisy Victoria RN  Outcome: Progressing  12/13/2024 0422 by Daisy Victoria RN  Outcome: Adequate for Discharge  Flowsheets (Taken 12/12/2024 2030)  Care Plan - Patient's Chronic Conditions and Co-Morbidity Symptoms are Monitored and Maintained or Improved: Monitor and assess patient's chronic conditions and comorbid symptoms for stability, deterioration, or improvement     Problem: Discharge Planning  Goal: Discharge to home or other facility with appropriate resources  12/13/2024 1101 by Anna Brandt RN  Outcome: Progressing  12/13/2024 0422 by Daisy Victoria RN  Outcome: Progressing  12/13/2024 0422 by Daisy Victoria RN  Outcome: Adequate for Discharge  Flowsheets (Taken 12/12/2024 2030)  Discharge to home or other facility with appropriate resources: Identify barriers to discharge with patient and caregiver     Problem: Safety - Adult  Goal: Free from fall injury  12/13/2024 1101 by Anna Brandt RN  Outcome: Progressing  Flowsheets (Taken 12/13/2024 1000)  Free From Fall Injury: Instruct family/caregiver on patient safety  12/13/2024 0422 by Daisy Victoria RN  Outcome: Progressing  12/13/2024 0422 by Daisy Victoria RN  Outcome: Adequate for Discharge     Problem: Skin/Tissue Integrity  Goal: Absence of new skin breakdown  Description: 1.  Monitor for areas of redness and/or skin breakdown  2.  Assess vascular access sites hourly  3.  Every 4-6 hours minimum:  Change oxygen saturation probe site  4.  Every 4-6 hours:  If on nasal continuous positive airway pressure, respiratory therapy assess nares and determine need for appliance change or resting period.  12/13/2024 0422 by Daisy Victoria RN  Outcome: Progressing  12/13/2024 0422 by Daisy Victoria  N, RN  Outcome: Adequate for Discharge

## 2024-12-13 NOTE — DISCHARGE INSTR - COC
Continuity of Care Form    Patient Name: Pelon Jarvis   :  1950  MRN:  [id number]    Admit date:  2024  Discharge date:  ***    Code Status Order: Limited   Advance Directives:   Advance Care Flowsheet Documentation             Admitting Physician:  Matilda Dixon DO  PCP: Kerry Chan MD    Discharging Nurse: JESUS BRITO RN  Discharging Hospital Unit/Room#: 8509/8509-A  Discharging Unit Phone Number: [phone]    Emergency Contact:   Extended Emergency Contact Information  Primary Emergency Contact: Lisa Flaherty  Mobile Phone: [phone]  Relation: Spouse  Secondary Emergency Contact: Sangita Billings  Mobile Phone: [phone]  Relation: Child    Past Surgical History:  Past Surgical History:   Procedure Laterality Date    APPENDECTOMY      IR MECHANICAL ART THROMBECTOMY INTRACRANIAL  2024    IR MECHANICAL ART THROMBECTOMY INTRACRANIAL 2024 Tate Tran MD [facility] SPECIAL PROCEDURES    NOSE SURGERY      TONSILLECTOMY         Immunization History:     There is no immunization history on file for this patient.    Active Problems:  Patient Active Problem List   Diagnosis Code    Cerebrovascular accident (CVA) ([facility]) I63.9    Celiac disease K90.0    Hypertension I10    Peripheral neuropathy G62.9    Arthritis M19.90    Hypotension due to hypovolemia E86.1    Goals of care, counseling/discussion Z71.89    Palliative care encounter Z51.5       Isolation/Infection:   Isolation            No Isolation          Patient Infection Status       None to display            Nurse Assessment:  Last Vital Signs: /65   Pulse 78   Temp 97.8 °F (36.6 °C) (Oral)   Resp 25   Ht 1.753 m (5' 9.02\")   Wt 78.6 kg (173 lb 4.5 oz)   SpO2 94%   BMI 25.58 kg/m²     Last documented pain score (0-10 scale): Pain Level: 0  Last Weight:   Wt Readings from Last 1 Encounters:   12/10/24 78.6 kg (173 lb 4.5 oz)     Mental Status:  {IP PT MENTAL STATUS:}    IV Access:  - None    Nursing

## 2024-12-13 NOTE — PROGRESS NOTES
Physical Therapy  Physical Therapy Treatment    Name: Pelon Jarvis  : 1950  MRN: [id number]      Date of Service: 2024    Evaluating PT:  Skinny Aparicio PT, DPT [id number]    Room #:  8509/8509-A  Diagnosis:  Acute CVA (cerebrovascular accident) ([facility]) [I63.9]  PMHx/PSHx:   has a past medical history of Arthritis, Celiac disease, Hypertension, and Peripheral neuropathy.   has a past surgical history that includes Tonsillectomy; Nose surgery; Appendectomy; and IR MECHANICAL ART THROMBECTOMY INTRACRANIAL (2024).  Procedure/Surgery:   Right MCA , Right ICA stenting   Precautions:  Falls, L hemiparesis, L tone, corpak, external catheter, aphasia, latent, alarms, SBP < 140  Equipment Needs:  TBD    SUBJECTIVE:    Pt lives alone in a 2 story home with 2 step(s) to enter, per chart.  Pt ambulated without device and was independent PTA.    OBJECTIVE:   Initial Evaluation  Date: 24 Treatment  2024 Short Term/ Long Term   Goals   AM-PAC 6 Clicks     Was pt agreeable to Eval/treatment? Yes Yes    Does pt have pain? No c/o pain Pain \" all over\"     Bed Mobility  Rolling: MaxA  Supine to sit: MaxA x 2 with HOB elevated  Sit to supine: MaxA x 2  Scooting: MaxA Rolling: NT  Supine to sit: MaxA x2  Sit to supine: MaxA x2  Scooting: NT Cristina   Transfers Sit to stand: NT  Stand to sit: NT  Stand pivot: NT Sit to stand: mod  x2  Stand to sit: MaxA x2  Stand pivot: NT Cristina with AAD   Ambulation   NT NT >15 feet with Cristina with AAD   Stair negotiation: ascended and descended NT NT    ROM BUE:  Defer to OT note  BLE:  WFL     Strength BUE:  Defer to OT note  RLE:  4/5  LLE:  0/5  Increase by 1/3 MMT grade   Balance Sitting EOB:  ModA to MaxA  Dynamic Standing:  NT Sitting EOB:  Cristina <> MaxA  Dynamic Standing:  NT Sitting EOB:  Independent  Dynamic Standing:  Cristina with AAD       Therapeutic Exercises/Activities:  PROM to L LE to improve strength and promote increased flexibility ( x 10 reps)     Patient

## 2024-12-13 NOTE — PLAN OF CARE
Problem: Chronic Conditions and Co-morbidities  Goal: Patient's chronic conditions and co-morbidity symptoms are monitored and maintained or improved  12/13/2024 0422 by Daisy Victoria RN  Outcome: Progressing  12/13/2024 0422 by Daisy Victoria RN  Outcome: Adequate for Discharge  Flowsheets (Taken 12/12/2024 2030)  Care Plan - Patient's Chronic Conditions and Co-Morbidity Symptoms are Monitored and Maintained or Improved: Monitor and assess patient's chronic conditions and comorbid symptoms for stability, deterioration, or improvement     Problem: Discharge Planning  Goal: Discharge to home or other facility with appropriate resources  12/13/2024 0422 by Daisy Victoria RN  Outcome: Progressing  12/13/2024 0422 by Daisy Victoria RN  Outcome: Adequate for Discharge  Flowsheets (Taken 12/12/2024 2030)  Discharge to home or other facility with appropriate resources: Identify barriers to discharge with patient and caregiver     Problem: Safety - Adult  Goal: Free from fall injury  12/13/2024 0422 by Daisy Victoria RN  Outcome: Progressing  12/13/2024 0422 by Daisy Victoria RN  Outcome: Adequate for Discharge     Problem: Skin/Tissue Integrity  Goal: Absence of new skin breakdown  Description: 1.  Monitor for areas of redness and/or skin breakdown  2.  Assess vascular access sites hourly  3.  Every 4-6 hours minimum:  Change oxygen saturation probe site  4.  Every 4-6 hours:  If on nasal continuous positive airway pressure, respiratory therapy assess nares and determine need for appliance change or resting period.  12/13/2024 0422 by Daisy Victoria RN  Outcome: Progressing  12/13/2024 0422 by Daisy Victoria RN  Outcome: Adequate for Discharge     Problem: ABCDS Injury Assessment  Goal: Absence of physical injury  12/13/2024 0422 by Daisy Victoria RN  Outcome: Progressing  12/13/2024 0422 by Daisy Victoria RN  Outcome: Adequate for Discharge     Problem: Neurosensory -

## 2024-12-13 NOTE — PROGRESS NOTES
SPEECH LANGUAGE PATHOLOGY  DAILY PROGRESS NOTE        PATIENT NAME:  Pelon Jarvis      ROOM:  8509/8509-A :  1950         TODAY'S DATE:  2024       Patient seen for cognition and dysphagia        Current Diet Order: ADULT TUBE FEEDING; Nasoenteric; Standard with Fiber; Continuous; 10; Yes; 10; Q 8 hours; 45; 300; Q 4 hours; Protein; 1 Dose; Daily  ADULT DIET; Dysphagia - Pureed; Moderately Thick (Honey)  Results and recommendations of swallowing evaluation reviewed.  Trials of upgraded diet textures to determine the least restrictive PO diet to maintain adequate nutrition/hydration were completed . Recommend diet remain the same  Oral motor strength/ coordination exercises to improve bolus prep/ control and mastication were completed with  moderate verbal prompts .  BOTR strength/ ROM exercises to reduce pharyngeal residuals and improve epiglottic inversion were completed with moderate verbal prompts  Laryngeal strength/ ROM therapeutic exercises to improve airway protection for the least restrictive PO diet  were completed with moderate verbal prompts      PT oriented x4 with min cues required.    Recalled personal information well.      Responses to questions were occasionally delay.    Will continue

## 2024-12-13 NOTE — PROGRESS NOTES
Occupational Therapy  OT BEDSIDE TREATMENT NOTE   [facility]  [address], [place]       Date:2024  Patient Name: Pelon Jarvis  MRN: [id number]  : 1950  Room: [address] [place] [address]-A     Per OT Eval:    Evaluating OT: Ana Johnson, OTR/L [id number]     Referring Provider:   Authorizing   Tate Tran MD       Specific Provider Orders/Date: OT eval and treat (24)        Diagnosis: Acute CVA (cerebrovascular accident) ([facility]) [I63.9]            Reason for admission:   Presented to outside hospital with confusion and left side weakness that began the night prior. He was found to have a right MCA CVA. He was transferred to [facility] for potential IR intervention. In IR he had thrombectomy of the MCA and triple stent placed in right ICA. He was started on an integrelin gtt and admitted to [facility].     Surgery/Procedures:  Right MCA , Right ICA stenting       Pertinent Medical History:    Past Medical History        Past Medical History:   Diagnosis Date    Arthritis 2024    Celiac disease 2024    Hypertension 2024    Peripheral neuropathy 2024         *Precautions:  Fall Risk, alarms, L hemiparesis, L tone, aphasia, latent responses, SBP<140, NPO/corpak     Assessment of current deficits   [x] Functional mobility          [x]ADLs           [x] Strength                  []Cognition   [x] Functional transfers        [x] IADLs         [x] Safety Awareness   [x]Endurance   [x] Fine Coordination           [x] ROM           [] Vision/perception    []Sensation     [x]Gross Motor Coordination [x] Balance    [] Delirium                  [x]Motor Control  [] Communication     OT PLAN OF CARE   OT POC based on physician orders, patient diagnosis and results of clinical assessment.        Frequency/Duration: 1-3 days/wk for 1-2 weeks PRN    Specific OT Treatment to include:   ADL retraining/adapted techniques and AE recommendations  in chair      Grooming Max A/Dep  [place] assist   Mod A  Simple tasks, supported upright in bed with R UE max cues to focus on task    No AROM L UE                       Min A   while seated & demonstrating G knowledge of compensatory techniques; using L UE as fair stabilizer assist.      UB dressing/bathing Max A  Simulated sponge bathing ex to increase L UE awareness, ROM and sensation Max A  Continued education on rafi-dressing techniques & increasing awareness of L UE, completed supported upright in bed, Fair- recall                       Mod A  demonstrating good initiation and follow through of rafi dressing techniques and requiring min cues for L sided body awareness during tasks.          LB dressing/bathing Dep  Dep                       Max A   using AE as needed for safe reach/ energy conservation        Toileting Dep  Dep                       Max A      Bed Mobility  Supine to sit:   Max A+2     Sit to supine:   Max A+2  Max A x 2  Supine < > Sit                       Mod A  in prep of ADL tasks & transfers   Functional Transfers Sit to stand:   NT     Stand to sit:   NT Mod A x 2  Blocking L LE  Max cues for upright posture with some improvement this date                        Mod A  sit<>stand/functional bathroom transfers using AD/DME as needed for balance and safety   Functional Mobility NT NT                       Mod A   functional/bathroom mobility using AD as needed & demonstrating fair safety      Balance Sitting:     Static:  ModA    Dynamic:Max A  Standing: NT  Sitting: Max A  Unable to self correct, maintain midline   Standing: Mod A x 2  Blocking L LE Cristina dynamic sitting balance; Mod A dynamic standing balance  during ADL tasks & transfers   Endurance/  Activity Tolerance    fair tolerance with light activity.    Fair  Seated at EOB 25 mins  Focusing on midline orientation with poor results, distracted & unable to maintain with max tactile, verbal & visual cues  good   tolerance with

## 2024-12-13 NOTE — DISCHARGE SUMMARY
through the dorsal stump and glide advantage wire with the formation of a U was taken up and was following the course of the right internal carotid artery. Using an exchange length catheter the diagnostic catheter was removed and a Ceraglide 71 was attempted to be taken up however given the degree of stenosis this could not be successfully obtained hence in a similar fashion the above-mentioned steps were repeated and instead of the sera glide 71 Zoom 55 was easily taken through the occluded right internal carotid artery and serial suction was performed. Tandem thrombus was up observed at the ophthalmic and the supraclinoid segment which was successfully aspirated. Following aspiration imaging was repeated. Repeat imaging reveals abrupt occlusion of the M1 segment. Small branch of the A3 is also occluded. However the MARLON has been successfully recanalized and the ophthalmic artery has been successfully recanalized.. Using Zoom 55 aspiration catheter along with the  microcatheter was prepped and with the support of a microwire was navigated across the M1 arterial occlusion and taken up into the superior division M2. An embolic trap 6.5 mm cross 45 mm stent retriever device was deployed from the M2 all the way to the supraclinoid artery. After 1 minute of deployment combined stent retriever aspiration technique was performed. Postoperative images reveal recanalization of the right middle cerebral artery and the right anterior cerebral artery. Following this given the severity of the stroke it was decided to recanalize the right internal carotid artery. Multiple angioplasty attempts were performed with the various balloons including the via track 5 mm across 20 mm and the via track 7 mm across 20 mm. Given the degree of calcification this kept on reoccluding. Following this I did take a cardiac balloon mounted stent marita frontier 5 mm cross 30 mm and taken to the degree of maximal stenosis it could not pass through the  reconstructions of axial coronal and sagittal images were reconstructed and reviewed My interpretation was  No evidence of acute intraparenchymal hemorrhage or hydrocephalus. IMPRESSION/RECOMMENDATIONS: Right middle cerebral artery occlusion status post successful mechanical thrombectomy. 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 Postprocedure neurological and hemodynamic monitoring was recommended. Patients: If you have questions regarding some of the verbiage in your report, please visit [email or web address] for a definition.  If you have any other questions, please contact your physician. Electronically signed by Tate Tran    MRI BRAIN WO CONTRAST    Result Date: 12/6/2024  EXAMINATION: MRI OF THE BRAIN WITHOUT CONTRAST  12/6/2024 7:52 pm TECHNIQUE: Multiplanar multisequence MRI of the brain was performed without the administration of intravenous contrast. COMPARISON: CT head without contrast, 04/03/2021. HISTORY: ORDERING SYSTEM PROVIDED HISTORY: 24 hours post thrombectomy for CVA TECHNOLOGIST PROVIDED HISTORY: Reason for exam:->24 hours post thrombectomy for CVA FINDINGS: INTRACRANIAL STRUCTURES/VENTRICLES: There is a large area of acute or early subacute infarction in the right frontal lobe, extending into the right insular cortex and the anterosuperior aspect of the right temporal lobe. While the majority of the infarction is in the right MCA territory, involvement of the paramedian aspect of the right frontal lobe indicates involvement of the right MARLON territory as well. Tiny focus of hemorrhage is seen in the right frontal operculum (series 5, image 56). Mild generalized cerebral volume loss.

## 2024-12-14 NOTE — PROGRESS NOTES
[facility] here to transfer pt to [facility]. Wife called and updated that transport had arrived. Pt's wife took belongings home when she left the hospital earlier. Pt denies any needs or complaints. Report was called on dayshift r/t original p/u was estimated for 5 p.

## 2025-01-08 ENCOUNTER — TELEPHONE (OUTPATIENT)
Dept: NEUROLOGY | Age: 75
End: 2025-01-08

## 2025-01-08 NOTE — TELEPHONE ENCOUNTER
[facility] called in today, patient needs a hosp f/u with Dr. Feliciano. Best call back is [phone]. Thanks!

## 2025-01-08 NOTE — TELEPHONE ENCOUNTER
IMPRESSION/RECOMMENDATIONS:     Right middle cerebral artery occlusion status post successful mechanical  thrombectomy.  Right supraclinoid ICA and right MARLON origin occlusion status post successful  aspiration thrombectomy     Right internal carotid artery 100% occlusion status post successful complete  recanalization with stent and balloon assisted angioplasty and reconstruction  with triple stents of the entire C1-C2 and C3 segments of the right internal  carotid artery.    Thrombectomy completed 12/05  3 month follow up post procedure    LVM to schedule

## 2025-01-13 NOTE — TELEPHONE ENCOUNTER
Beronica from [facility] calling back to schedule patient with Dr Feliciano. Please contact her to schedule.Thank you